# Patient Record
Sex: MALE | Race: WHITE | NOT HISPANIC OR LATINO | Employment: OTHER | URBAN - METROPOLITAN AREA
[De-identification: names, ages, dates, MRNs, and addresses within clinical notes are randomized per-mention and may not be internally consistent; named-entity substitution may affect disease eponyms.]

---

## 2022-05-12 ENCOUNTER — TELEPHONE (OUTPATIENT)
Dept: NEUROSURGERY | Facility: OTHER | Age: 67
End: 2022-05-12

## 2022-05-12 NOTE — TELEPHONE ENCOUNTER
Pedro from [facility]- looking for faxed referral. Message sent to referral team in house to review incoming faxes.  If need be another referral can be faxed.     [phone]

## 2022-06-14 ENCOUNTER — OFFICE VISIT (OUTPATIENT)
Dept: NEUROSURGERY | Facility: CLINIC | Age: 67
End: 2022-06-14
Payer: MEDICARE

## 2022-06-14 VITALS
DIASTOLIC BLOOD PRESSURE: 72 MMHG | WEIGHT: 186.2 LBS | SYSTOLIC BLOOD PRESSURE: 128 MMHG | HEART RATE: 56 BPM | BODY MASS INDEX: 29.22 KG/M2 | HEIGHT: 67 IN | OXYGEN SATURATION: 99 %

## 2022-06-14 DIAGNOSIS — M54.12 CERVICAL RADICULOPATHY: Primary | ICD-10-CM

## 2022-06-14 PROCEDURE — 99203 OFFICE O/P NEW LOW 30 MIN: CPT | Performed by: NEUROLOGICAL SURGERY

## 2022-06-14 RX ORDER — ACETAMINOPHEN 160 MG/5ML
160 SUSPENSION, ORAL (FINAL DOSE FORM) ORAL DAILY
COMMUNITY

## 2022-06-14 RX ORDER — LEVOTHYROXINE SODIUM 100 UG/1
100 TABLET ORAL DAILY
COMMUNITY

## 2022-06-14 RX ORDER — ASPIRIN 81 MG/1
81 TABLET ORAL DAILY
Status: ON HOLD | COMMUNITY
End: 2022-09-22

## 2022-06-14 RX ORDER — MULTIPLE VITAMINS W/ MINERALS TAB 9MG-400MCG
1 TAB ORAL DAILY
COMMUNITY

## 2022-06-14 ASSESSMENT — PAIN SCALES - GENERAL: PAINLEVEL: NO PAIN (0)

## 2022-06-14 NOTE — PROGRESS NOTES
Mr. Guevara is a 67-year-old man seen today at the request of Dr. Aleksandra Adorno for evaluation of bilateral hand numbness and paresthesias present for several years but increased in the last several months with right side predominant.  He has undergone a recent cervical MRI scan which shows significant cervical spondylitic change in his mid cervical spine and there is expressed concern whether this arises as cervical radiculopathy.  It is noted that he has no complaints of neck pain and does not associate cervical range of motion which is essentially normal for age and condition with the symptoms into his hands.  He has been treated with a cock up wrist brace on the right which he believes has been helpful with sleep.  He is awakened at night otherwise with the symptoms.  He denies any symptoms consistent with radiculopathy or symptoms proximal to the wrist bilaterally.  He is noted to be right-hand dominant.  His most symptomatic posterior is while driving with his hands elevated on the steering well.    He is a retired service industry .    On examination, he is awake alert and oriented x3 with memory intact for recent and remote events and speech clear in character and content.  There are no focal cranial nerve abnormalities identified.  Cervical range of motion appears normal for age and condition he is able to touch his chin to his chest and shows at least 20 degrees of cervical extension without symptoms.  He is capable of greater than 45 degrees of rotation in either direction.  Spurling sign is negative.  Lhermitte's phenomenon is absent.  He denies significant cervical pain with range of motion.  Motor examination of his bilateral upper extremity shows 5/5 strength throughout including handgrip, biceps, brachioradialis, triceps, deltoids, etc.  D-Phen reflex testing is 1+ and equal to biceps triceps and brachioradialis bilaterally.  There is no evidence of long track findings and gait is  normal.    Phalen's and Tinel's sign are negative at the wrist bilaterally.    Review of his cervical MRI scan obtained on April 6, 2022 shows significant multilevel cervical spondylitic change most pronounced at the C5-6 level with anterior and posterior osteophyte formation and midline broad-based disc bulging.  Significant Modic changes are noted at C5-6 and C6-7.  There is slight anterolisthesis of C4 on C5.  There is reversal of expected cervical lordosis centered at the C5-6 level.  Cervical spinal canal however is widely patent and there is no evidence of significant impingement of the cervical spinal cord to my review.  There is no intrinsic signal change in the spinal cord at any level.  There are multiple levels of bilateral neural foraminal stenosis noted through the mid cervical spine.    Bilateral hand numbness involving the index middle finger and thumb as well as partial involvement of the fourth finger strongly suggesting bilateral median neuropathy at the wrist consistent with carpal tunnel syndrome.  I find no evidence on examination of cervical radiculopathy.  I have requested an EMG of the upper extremities to determine the source of these paresthesias.  I have asked Mr. Guevara to return following that study for reevaluation and discussion of management alternatives.  Patient was accompanied by his wife was present throughout the course the interview and examination.  Both patient and his wife appear to have good understanding of our discussion, asked appropriate questions which I answered and willing to proceed as recommended.    Approximately 40 minutes in total was spent with the patient majority reviewing differential diagnosis, management alternatives, risks and benefits.

## 2022-06-14 NOTE — PATIENT INSTRUCTIONS
Patient Next Steps:      Order placed for Bilateral hands EMG. You can call the phone number highlighted in the order to schedule your appointment. We will call you with the results once EMG is completed.    Dr Ca would like to see you back in the clinic for follow up in once EMG is completed.    Please call us if you have any further questions or concerns.    [facility]   Phone: [phone]  Fax: [phone]

## 2022-08-10 ENCOUNTER — OFFICE VISIT (OUTPATIENT)
Dept: NEUROLOGY | Facility: CLINIC | Age: 67
End: 2022-08-10
Attending: NEUROLOGICAL SURGERY
Payer: MEDICARE

## 2022-08-10 DIAGNOSIS — G56.23 CUBITAL TUNNEL SYNDROME, BILATERAL: ICD-10-CM

## 2022-08-10 DIAGNOSIS — G56.03 BILATERAL CARPAL TUNNEL SYNDROME: ICD-10-CM

## 2022-08-10 DIAGNOSIS — M54.12 CERVICAL RADICULOPATHY: ICD-10-CM

## 2022-08-10 PROCEDURE — 95886 MUSC TEST DONE W/N TEST COMP: CPT | Mod: RT | Performed by: PSYCHIATRY & NEUROLOGY

## 2022-08-10 PROCEDURE — 95912 NRV CNDJ TEST 11-12 STUDIES: CPT | Performed by: PSYCHIATRY & NEUROLOGY

## 2022-08-10 NOTE — LETTER
8/10/2022         RE: Salvatore Guevara  [address]  [place] [facility] [address]        Dear Colleague,    Thank you for referring your patient, Salvatore Guevara, to the [facility] [place]. Please see a copy of my visit note below.    See procedure note for EMG report      Again, thank you for allowing me to participate in the care of your patient.        Sincerely,        Eliud Earl MD

## 2022-08-10 NOTE — PROCEDURES
ELECTROMYOGRAPHY (EMG) REPORT       [facility]UR[facility] [place]  [address] [place] [address]  Tel: [phone]  Fax: [phone]  [email or web address]     Salvatore Guevara,  1955, MRN [id number]  PCP: Aleksandra Adorno  Date: 8/10/2022     Principal Diagnosis:    Cervical radiculopathy  Bilateral carpal tunnel syndrome  Cubital tunnel syndrome, bilateral     Height: 5 feet 7 inch  Reason for referral: Evaluate bilateral uppers. c/o tingling in both hands/fingers > 5 years. Right > Left.       Motor NCS      Nerve / Sites Lat Amp Dist Patel    ms mV cm m/s   R Median - APB      Wrist 7.97 8.1 7       Elbow 13.49 7.2 26 47   L Median - APB      Wrist 5.42 11.5 7       Elbow 10.16 11.0 24 51   R Ulnar - ADM      Wrist 2.92 11.8 7       B.Elbow 6.35 11.7 20.5 60      A.Elbow 8.85 11.4 12 48   L Ulnar - ADM      Wrist 2.71 13.5 7       B.Elbow 5.73 12.6 19.5 65      A.Elbow 8.39 12.0 12 45       F  Wave      Nerve Fmin    ms   R Ulnar - ADM 29.84   L Ulnar - ADM 28.39       Sensory NCS      Nerve / Sites Onset Lat Pk Lat Amp.2-3 Dist Patel Lat Diff    ms ms  V cm m/s ms   R Median - II (Antidr)      Wrist 4.74 5.73 7.5 13 27    L Median - II (Antidr)      Wrist 3.85 4.74 6.3 13 34    R Ulnar - V (Antidr)      Wrist 2.34 2.86 7.2 11 47    L Ulnar - V (Antidr)      Wrist 2.45 3.33 6.9 11 45    R Median, Ulnar - Transcarpal comparison      Median Palm 3.65 4.53 8.1 8 22       Ulnar Palm 1.35 2.03 14.7 8 59          2.50   L Median, Ulnar - Transcarpal comparison      Median Palm 3.33 3.85 11.8 8 24       Ulnar Palm 1.46 2.03 22.0 8 55          1.82       EMG Summary Table     Spontaneous MUAP Rcmt Note   Muscle Fib PSW Fasc IA # Amp Dur PPP Rate Type   R. Brachioradialis None None None N N N N N N N   R. Pronator teres None None None N N N N N N N   R. Biceps brachii None None None N N N N N N N   R. Deltoid None None None N N N N N N N   R. Triceps brachii None None None N N N N N N  N   R. Extensor digitorum communis None None None N N N N N N N   R. Flexor carpi ulnaris None None None N N N N N N N   R. Abductor digiti minimi (manus) None None None N N N N N N N   R. First dorsal interosseous None None None N N N N N N N   R. Abductor pollicis brevis None None None N Sl Mod Red Incr Incr N N N   L. Brachioradialis None None None N N N N N N N   L. Pronator teres None None None N N N N N N N   L. Biceps brachii None None None N N N N N N N   L. Deltoid None None None N N N N N N N   L. Triceps brachii None None None N N N N N N N   L. Flexor carpi ulnaris None None None N N N N N N N   L. First dorsal interosseous None None None N N N N N N N   L. Abductor pollicis brevis None None None N N N N N N N        Summary: Nerve conduction and EMG study of bilateral upper extremities shows:  1. Delayed bilateral median distal motor latencies with normal amplitudes and conduction velocities.  2. Normal bilateral ulnar distal motor latencies with normal amplitude and slow conduction across the elbow  3. Normal bilateral ulnar F latencies  4. Delayed bilateral median peak sensory latencies with slow sensory nerve conduction velocities  5. Normal bilateral ulnar SNAP  6. Needle exam showed changes as above    Impression:   This is a abnormal nerve conduction and EMG study of bilateral lower extremities that suggests:  1. Bilateral median neuropathy at or distal to the wrist consistent with moderate to severe bilateral carpal tunnel syndrome  2. Bilateral ulnar entrapment at the elbow consistent with bilateral cubital tunnel syndrome.      Eliud Earl MD  [facility]  (Formerly, [facility]      This note was dictated using voice recognition software.  Any grammatical or context distortions are unintentional and inherent to the software.

## 2022-08-30 ENCOUNTER — OFFICE VISIT (OUTPATIENT)
Dept: NEUROSURGERY | Facility: CLINIC | Age: 67
End: 2022-08-30
Payer: MEDICARE

## 2022-08-30 VITALS — HEART RATE: 54 BPM | OXYGEN SATURATION: 97 % | SYSTOLIC BLOOD PRESSURE: 126 MMHG | DIASTOLIC BLOOD PRESSURE: 80 MMHG

## 2022-08-30 DIAGNOSIS — G56.03 BILATERAL CARPAL TUNNEL SYNDROME: Primary | ICD-10-CM

## 2022-08-30 PROCEDURE — 99213 OFFICE O/P EST LOW 20 MIN: CPT | Performed by: NEUROLOGICAL SURGERY

## 2022-08-30 ASSESSMENT — PAIN SCALES - GENERAL: PAINLEVEL: EXTREME PAIN (9)

## 2022-08-30 NOTE — NURSING NOTE
"Reviewed pre- and post-operative instructions as outlined in the After Visit Summary/Patient Instructions with patient.   Surgery folder was given to patient    Patient Education Topic: Procedure with Dr. Ca     Learner(s): Patient and Significant other/Spouse     Knowledge Level: Basic    Readiness to Learn: Ready    Method:  Verbal explanation and Written material     Outcome: Able to verbalize instructions    Barriers to Learning: No barrier    Covid Testing: patient educated they will need to have a test for the novel Coronavirus, COVID-19.The rapid antigen test needs to be completed within 1-2 days prior to surgery. Patient instructed to take a photo of the negative test and bring to surgery to show to the nurse. If positive, patient instructed to refer to the \"Before Your Procedure or Hospital Admission\" printout.. Order Placed.    Scheduling Number: [phone]        Patient had the opportunity for questions to be answered. Advised Patient and Significant other/Spouse  to call clinic with any questions/concerns. Gave patient antibacterial soap for pre-surgery skin preparation.    t  "

## 2022-08-30 NOTE — NURSING NOTE
"Salvatore Guevara is a 67 year old male who presents for:  Chief Complaint   Patient presents with     RECHECK     Review EMG results        Initial Vitals:  /80   Pulse 54   SpO2 97%  Estimated body mass index is 29.16 kg/m  as calculated from the following:    Height as of 6/14/22: 5' 7\" (1.702 m).    Weight as of 6/14/22: 186 lb 3.2 oz (84.5 kg).. There is no height or weight on file to calculate BSA. BP completed using cuff size: large  Extreme Pain (9)    Nursing Comments:     Juan Linn MA    "

## 2022-08-30 NOTE — PROGRESS NOTES
Mr. Guevara returns with a continued complaint of numbness into his right thumb, index and middle finger.  He has undergone an EMG which shows evidence of severe bilateral median neuropathy at the wrist.  EMG also showed evidence of significant bilateral ulnar neuropathy as well.  However, on examination there is no evidence of ulnar innervated musculature weakness or sensory loss.  There is no pain complaint in an ulnar distribution and no evidence of atrophy of intrinsic hand musculature.    I reviewed the differential diagnosis for this problem.  While Mr. Guevara clearly suffers from cervical spondylitic disease there is no apparent association between his hand symptoms and these findings on cervical MRI scan.  Specifically, he denies neck pain or any exacerbation of his upper extremity symptoms with cervical range of motion or other activities or postures.  However, he has a positive Tinel's sign at the right wrist, notes that the use of a wrist brace has improved his ability to sleep and reports that he is still awakened at night by paresthesias into his hands, right worse than the left.    Assessment: Symptomatic bilateral carpal tunnel syndrome with the right more affected than the left.  I reviewed management options with the patient and his wife at some length in detail.  I have recommended proceeding with right carpal tunnel release.  I outlined the details of the procedure to the patient and told him that the risks of the procedure include failure to improve his symptoms, increased pain weakness or numbness, injury to the nerve or its branches, infection, blood bleeding, wound dehiscence, etc.  At the conclusion of our discussion he appeared to have good understanding of the situation, asked appropriate questions which answered and wished to proceed with surgery as soon as practical.  Anticipate this will be performed using a local anesthetic with IV sedation on an outpatient  basis.    Approximately 25 minutes in total was spent with the patient today the majority reviewing differential diagnosis, management alternatives, risks and benefits.

## 2022-08-30 NOTE — LETTER
8/30/2022         RE: Salvatore Guevara  [address]  [facility] [address]        Dear Colleague,    Thank you for referring your patient, Salvatore Guevara, to the [facility]. Please see a copy of my visit note below.    Mr. Guevara returns with a continued complaint of numbness into his right thumb, index and middle finger.  He has undergone an EMG which shows evidence of severe bilateral median neuropathy at the wrist.  EMG also showed evidence of significant bilateral ulnar neuropathy as well.  However, on examination there is no evidence of ulnar innervated musculature weakness or sensory loss.  There is no pain complaint in an ulnar distribution and no evidence of atrophy of intrinsic hand musculature.    I reviewed the differential diagnosis for this problem.  While Mr. Guevara clearly suffers from cervical spondylitic disease there is no apparent association between his hand symptoms and these findings on cervical MRI scan.  Specifically, he denies neck pain or any exacerbation of his upper extremity symptoms with cervical range of motion or other activities or postures.  However, he has a positive Tinel's sign at the right wrist, notes that the use of a wrist brace has improved his ability to sleep and reports that he is still awakened at night by paresthesias into his hands, right worse than the left.    Assessment: Symptomatic bilateral carpal tunnel syndrome with the right more affected than the left.  I reviewed management options with the patient and his wife at some length in detail.  I have recommended proceeding with right carpal tunnel release.  I outlined the details of the procedure to the patient and told him that the risks of the procedure include failure to improve his symptoms, increased pain weakness or numbness, injury to the nerve or its branches, infection, blood bleeding, wound dehiscence, etc.  At the conclusion of our discussion he  appeared to have good understanding of the situation, asked appropriate questions which answered and wished to proceed with surgery as soon as practical.  Anticipate this will be performed using a local anesthetic with IV sedation on an outpatient basis.    Approximately 25 minutes in total was spent with the patient today the majority reviewing differential diagnosis, management alternatives, risks and benefits.      Again, thank you for allowing me to participate in the care of your patient.        Sincerely,        Chan Ca MD

## 2022-08-30 NOTE — PATIENT INSTRUCTIONS
"  Patient Instructions    -Surgery scheduled at [facility] for Right Carpal Tunnel release with Dr. Ca    Pre-Operative:    Surgical risks: blood clots in the leg or lung, problems urinating, nerve damage, drainage from the incision, infection,stiffness  Pre-operative physical with primary care physician within 30 days of surgical date.  Likely same day procedure with discharge home day of surgery, may stay for 23 hour observation hospitalization for monitoring.    Surgery takes about 1 hour. You will be completely asleep (general anesthesia)     Shower procedure  Please shower with antimicrobial soap the night before surgery and morning of surgery. Please refer to showering instruction sheet in folder.    Eating/Drinking  Stop all solid foods 8 hours before surgery.  Keep drinking clear liquids until 4 hours before surgery  Clear liquids include water, clear juice, black coffee, or clear tea without milk, Gatorade, clear soda.   Medications  Hold Aspirin, NSAIDs (Advil/Ibuprofen, Indocin, Naproxen,Nuprin,Relafen/Nabumetone, Diclofenac,Meloxicam, Aleve, Celebrex) x 7 days prior to surgical date  Hold methotrexate, Xeljanz for 1-2 weeks prior to surgery and continue to hold 2 weeks post surgery.   You can take Tylenol (Acetaminophen) for pain, 1000 mg  Do not exceed 3,000 mg per day   Any other medications prescribed, please discuss with your primary care provider at your pre-operative physical     As part of preparation for your upcoming procedure you are required to have a test for the novel Coronavirus, COVID-19  SDS Covid Testing: You will need to have a test for the novel Coronavirus, COVID-19.The rapid antigen test needs to be completed within 1-2 days prior to surgery. Please take a photo of the negative test and bring to surgery to show the nurse. If positive, please refer to the \"Before Your Procedure or Hospital Admission\" printout.  You may NOT receive the COVID-19 vaccine 72 hours " before or after surgery.    Post-Operative:  You may resume regular diet after surgery  Pain Management  You will have post-operative incisional pain which may require pain medications and muscle relaxants. You will receive medication upon discharge.  You may resume taking NSAIDs (ex. Ibuprofen, aleve, naproxen) immediately post-op   Do NOT drive while taking narcotic pain medication  Do NOT drink alcohol while using any pain medication  You can utilize ice as needed (no longer than 20 minutes at one time)  Ice and elevation to control swelling can also be effective in helping to control pain           Incision Care  No submerging incision in water such as pools, hot tubs, baths for at least 8 weeks or until incision is healed  It is okay to shower, just pat the incision dry   Remove dressing as instructed upon discharge  Watch for signs of infection  Redness, swelling, warmth, drainage, and fever of 101 degrees or higher  Notify clinic [phone].      Activity Restrictions  For the first 6 weeks, no lifting more than 5 pounds with operative arm. Overuse and repetitive tasks with your operative arm should be avoided. You will gradually increase how much, and for how long, you use your arm. Use pain as your guide.  Ok to walk as tolerated, take short frequent walks. You may gradually increase the distance as tolerated.   Avoid bed rest and prolonged sitting for longer than 30 minutes (change positions frequently while awake)  No contact sports until after follow up visit  No high impact activities such as; running/jogging, snowmobile or 4 jorge riding or any other recreational vehicles  Please call the clinic if you develop any of the following symptoms:  Swelling and/or warmth in one or both legs  Pain or tenderness in your leg, ankle, foot, or arm   Red or discolored skin         Post-Op Follow Up Appointments  2 week incision check with RN  6 week post op follow up visit with Physician Assistant or Nurse  Practitioner.   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  Please fax any FMLA/short term disability paperwork to [phone]  You may call our clinic when you'd like to return to work and we can provide a work letter  To allow staff adequate time to complete paperwork, please send as soon as possible      Carpal Tunnel Release Surgery  Surgery may be done if your carpal tunnel syndrome (CTS) symptoms become severe. Or you may have surgery if no other treatment eases your symptoms. There are 2 types of CTS surgical procedures. You will be told about the one you will have. You ll also be instructed on how to prepare for it.      The goals of surgery  Two types of surgery are used to treat CTS: open and endoscopic.  Open surgery. With open surgery, your surgeon makes 1 cut (incision) in your palm. Standard surgical tools are used.  Endoscopic surgery. For this surgery, 1 or 2 small incisions are made in your hand or wrist. A thin tube with a very small camera attached (endoscope) is inserted under the carpal ligament. Tiny tools are inserted there as well. The surgeon then operates while watching images on a video screen. Each procedure has the same goal: Your surgeon will relieve pressure on the median nerve. To do this, the transverse carpal ligament is cut (released).  After surgery  If you ve had carpal tunnel surgery, you will spend a few hours resting before you go home. The nerve sensation and blood circulation in your hand will be checked at this time. For the safest healing, do the following:  Keep your hand raised above heart level. This will help reduce swelling.  Limit hand and wrist use as instructed. You may need a wrist brace.  Take any pain  medicine as directed.  Do hand exercises as directed by your surgeon or therapist.  When to call the surgeon  Call your surgeon if you notice any of the following:  White or pale-blue hand or nails (you pinch your skin or nail and the color doesn t return)  Pain that is not relieved by prescribed medicine  Loss of sensation or excess swelling in hand or fingers  Pus-like liquid oozing out of your wound  Fever over 100.4 F (38 C)  Date Last Reviewed: 1/1/2018 2000-2019 [employer]. [address] [place], [place] [address]. All rights reserved. This information is not intended as a substitute for professional medical care. Always follow your healthcare professional's instructions.

## 2022-09-20 ENCOUNTER — TELEPHONE (OUTPATIENT)
Dept: NEUROSURGERY | Facility: CLINIC | Age: 67
End: 2022-09-20

## 2022-09-20 NOTE — TELEPHONE ENCOUNTER
Called pt back and discussed that pain medication is not pre-prescribed prior to surgery. It is recommended to choose a pharmacy of choice prior to surgery to have rx sent to that would be open at time of anticipated discharge.

## 2022-09-20 NOTE — TELEPHONE ENCOUNTER
Patient is requesting a return call, he would like to request pain medication be ordered now so that he can pick them up directly after his surgery on 9/22/22.

## 2022-09-21 ENCOUNTER — ANESTHESIA EVENT (OUTPATIENT)
Dept: SURGERY | Facility: CLINIC | Age: 67
End: 2022-09-21
Payer: MEDICARE

## 2022-09-21 NOTE — ANESTHESIA PREPROCEDURE EVALUATION
"Anesthesia Pre-Procedure Evaluation    Patient: Salvatore Guevara   MRN: [id number] : 1955        Procedure : Procedure(s):  Right carpal tunnel release          Past Medical History:   Diagnosis Date     Hypothyroidism       Past Surgical History:   Procedure Laterality Date     CARDIAC CATHERIZATION       COLONOSCOPY       ORTHOPEDIC SURGERY      RIGHT & LEFT TOTAL HIP ARTHROPLASTY     TONSILLECTOMY        No Known Allergies   Social History     Tobacco Use     Smoking status: Never Smoker     Smokeless tobacco: Never Used   Substance Use Topics     Alcohol use: Yes     Comment: 14 BEERS/ WEEK      Wt Readings from Last 1 Encounters:   22 84.5 kg (186 lb 3.2 oz)        Prior to Admission medications    Medication Sig Start Date End Date Taking? Authorizing Provider   amoxicillin (AMOXIL) 500 MG capsule Take 500 mg by mouth See Admin Instructions Take 4 capsules ONE hour prior to dental procedure   Yes Reported, Patient   clotrimazole (LOTRIMIN) 1 % external cream Apply topically 2 times daily   Yes Reported, Patient   levothyroxine (SYNTHROID/LEVOTHROID) 100 MCG tablet Take 100 mcg by mouth daily Take 1 tab by mouth daily   Yes Reported, Patient   MISC NATURAL PRODUCTS PO Melaleuca brand product \"Longevity packs\" AM and PM doses   Yes Reported, Patient   sildenafil (VIAGRA) 100 MG tablet Take 100 mg by mouth daily as needed   Yes Reported, Patient   aspirin 81 MG EC tablet Take 81 mg by mouth daily Take one tab daily    Reported, Patient   multivitamin w/minerals (THERA-VIT-M) tablet Take 1 tablet by mouth daily Take 1 tab daily    Reported, Patient   Saw [place] (Serenoa repens) (SAW PALMETTO EXTRACT) 160 MG CAPS Take 160 mg by mouth daily Take 2 caps by mouth every evening    Reported, Patient     Anesthesia Evaluation   Pt has had prior anesthetic. Type: General.        ROS/MED HX  ENT/Pulmonary:       Neurologic:       Cardiovascular:     (+) --CAD (minimal per cath) ---Previous cardiac " testing     METS/Exercise Tolerance:     Hematologic:       Musculoskeletal:   (+) arthritis,     GI/Hepatic:       Renal/Genitourinary:       Endo:     (+) thyroid problem, hypothyroidism,     Psychiatric/Substance Use:       Infectious Disease:       Malignancy:       Other:                  Trey Francis MD - 2016 12:00 AM CDT   Formatting of this note is different from the original.       [facility]     CARDIAC CATHETERIZATION REPORT       PATIENT DATA   Name:  Salvatore Guevara   Date:  2016   :  1955   Sex:  M   Record #  [id number]     CLINICAL HISTORY:    Salvatore is a 61-year-old male who presented to the emergency room with chest pain and severe shortness of breath.  EKG identified marked ST segment depression, as well as new atrial fibrillation with rapid ventricular response.  He is undergoing emergent coronary angiography for further evaluation.     PRIMARY PROVIDER:    Aleksandra Adorno M.D.     CARDIOLOGIST PERFORMING STUDY:   Trey Francis M.D.     TYPE OF PROCEDURE:     1) Selective coronary angiography.   2) Left heart catheterization.   3) Left ventriculography.   4) Selective right iliac arteriogram.   5) Perclose.     PROCEDURE:   A 6 Norwegian sheath was introduced into the right femoral artery and selective coronary angiography was performed with 6 Norwegian FL4 and 6 Norwegian FR4 diagnostic catheters.  Left heart catheterization and left ventriculography were completed with a 6 Norwegian pigtail catheter. A selective right iliac arteriogram was performed and Perclose was used to repair the right femoral arterial access site.       HEMODYNAMIC DATA:    Please see data sheet.  The aortic pressure was 110/70.  The left ventricular pressure was 110/18.     ANGIOGRAPHY:   LEFT VENTRICULOGRAPHY revealed normal systolic function with an ejection fraction of 70%.  There was no mitral regurgitation.   CORONARY ANGIOGRAPHY:   LEFT MAIN:    The left main was very long with  mild luminal irregularities.   LAD:    The left anterior descending artery was free of significant disease.   CIRCUMFLEX:    The left circumflex was small, nondominant and free of significant disease.   RCA:    The right coronary artery was the dominant vessel and had mild luminal irregularities.     TECHNICAL FACTORS:  Sedation was achieved with IV Versed and fentanyl.       COMPLICATIONS:   None.     CONCLUSION:     1) Minimal coronary artery disease.     2) Normal left ventricular systolic function.     Nikunj Feng MD - 09/28/2016 12:00 AM CDT   Formatting of this note is different from the original.     [facility] [place], [place]   TRANSTHORACIC ECHOCARDIOGRAM REPORT   INDICATION: Acute myocarditis.       PROCEDURE:  Limited transthoracic, two-dimensional, and M-mode echocardiography was performed from the parasternal, apical, and subcostal windows.  Simultaneous pulsed-wave, continuous wave and color flow Doppler was performed.       IMAGE QUALITY:    Good.     ECG RHYTHM:    Sinus.     RESULTS:   This is a limited echocardiographic study.  Normal left ventricular chamber size noted.  Normal left ventricular wall motion with ejection fraction of 65%.       CONCLUSION:   1. Limited echocardiographic study.     2. Normal left ventricular wall motion.   3. Left ventricular ejection fraction visually estimated at 65%.     4. When compared to patient's previous study dated 6/27/2016, there is no change.       OUTSIDE LABS:    Anesthesia Plan    ASA Status:  2      Anesthesia Type: MAC.     - Reason for MAC: straight local not clinically adequate         Techniques and Equipment:       - Drips/Meds: Dexmed. bolus     Consents            Postoperative Care    Pain management: Multi-modal analgesia.   PONV prophylaxis: Ondansetron (or other 5HT-3), Background Propofol Infusion     Comments:    Other Comments: 2 drinks EtOH - may require higher doses  of sedation            Felice Irby MD

## 2022-09-22 ENCOUNTER — HOSPITAL ENCOUNTER (OUTPATIENT)
Facility: CLINIC | Age: 67
Discharge: HOME | End: 2022-09-22
Attending: NEUROLOGICAL SURGERY | Admitting: NEUROLOGICAL SURGERY
Payer: MEDICARE

## 2022-09-22 ENCOUNTER — ANESTHESIA (OUTPATIENT)
Dept: SURGERY | Facility: CLINIC | Age: 67
End: 2022-09-22
Payer: MEDICARE

## 2022-09-22 VITALS
TEMPERATURE: 97.7 F | WEIGHT: 189 LBS | RESPIRATION RATE: 19 BRPM | HEART RATE: 61 BPM | BODY MASS INDEX: 29.66 KG/M2 | SYSTOLIC BLOOD PRESSURE: 114 MMHG | DIASTOLIC BLOOD PRESSURE: 67 MMHG | HEIGHT: 67 IN | OXYGEN SATURATION: 98 %

## 2022-09-22 DIAGNOSIS — G56.01 RIGHT CARPAL TUNNEL SYNDROME: Primary | ICD-10-CM

## 2022-09-22 PROCEDURE — 258N000003 HC RX IP 258 OP 636

## 2022-09-22 PROCEDURE — 710N000009 HC RECOVERY PHASE 1, LEVEL 1, PER MIN: Performed by: NEUROLOGICAL SURGERY

## 2022-09-22 PROCEDURE — 64721 CARPAL TUNNEL SURGERY: CPT | Mod: RT | Performed by: NEUROLOGICAL SURGERY

## 2022-09-22 PROCEDURE — 258N000003 HC RX IP 258 OP 636: Performed by: ANESTHESIOLOGY

## 2022-09-22 PROCEDURE — 360N000075 HC SURGERY LEVEL 2, PER MIN: Performed by: NEUROLOGICAL SURGERY

## 2022-09-22 PROCEDURE — 250N000009 HC RX 250

## 2022-09-22 PROCEDURE — 370N000017 HC ANESTHESIA TECHNICAL FEE, PER MIN: Performed by: NEUROLOGICAL SURGERY

## 2022-09-22 PROCEDURE — 999N000141 HC STATISTIC PRE-PROCEDURE NURSING ASSESSMENT: Performed by: NEUROLOGICAL SURGERY

## 2022-09-22 PROCEDURE — 250N000011 HC RX IP 250 OP 636

## 2022-09-22 PROCEDURE — 250N000009 HC RX 250: Performed by: NEUROLOGICAL SURGERY

## 2022-09-22 PROCEDURE — 250N000011 HC RX IP 250 OP 636: Performed by: PHYSICIAN ASSISTANT

## 2022-09-22 PROCEDURE — 272N000001 HC OR GENERAL SUPPLY STERILE: Performed by: NEUROLOGICAL SURGERY

## 2022-09-22 PROCEDURE — 710N000012 HC RECOVERY PHASE 2, PER MINUTE: Performed by: NEUROLOGICAL SURGERY

## 2022-09-22 RX ORDER — EPHEDRINE SULFATE 50 MG/ML
INJECTION, SOLUTION INTRAMUSCULAR; INTRAVENOUS; SUBCUTANEOUS PRN
Status: DISCONTINUED | OUTPATIENT
Start: 2022-09-22 | End: 2022-09-22

## 2022-09-22 RX ORDER — ONDANSETRON 4 MG/1
4 TABLET, ORALLY DISINTEGRATING ORAL EVERY 4 HOURS PRN
Status: DISCONTINUED | OUTPATIENT
Start: 2022-09-22 | End: 2022-09-22 | Stop reason: HOSPADM

## 2022-09-22 RX ORDER — ONDANSETRON 2 MG/ML
4 INJECTION INTRAMUSCULAR; INTRAVENOUS
Status: DISCONTINUED | OUTPATIENT
Start: 2022-09-22 | End: 2022-09-22 | Stop reason: HOSPADM

## 2022-09-22 RX ORDER — CLOTRIMAZOLE 1 %
CREAM (GRAM) TOPICAL 2 TIMES DAILY
COMMUNITY

## 2022-09-22 RX ORDER — HYDROMORPHONE HCL IN WATER/PF 6 MG/30 ML
0.2 PATIENT CONTROLLED ANALGESIA SYRINGE INTRAVENOUS EVERY 5 MIN PRN
Status: DISCONTINUED | OUTPATIENT
Start: 2022-09-22 | End: 2022-09-22 | Stop reason: HOSPADM

## 2022-09-22 RX ORDER — AMOXICILLIN 500 MG/1
500 CAPSULE ORAL SEE ADMIN INSTRUCTIONS
COMMUNITY

## 2022-09-22 RX ORDER — ONDANSETRON 2 MG/ML
INJECTION INTRAMUSCULAR; INTRAVENOUS PRN
Status: DISCONTINUED | OUTPATIENT
Start: 2022-09-22 | End: 2022-09-22

## 2022-09-22 RX ORDER — FENTANYL CITRATE 0.05 MG/ML
50 INJECTION, SOLUTION INTRAMUSCULAR; INTRAVENOUS EVERY 5 MIN PRN
Status: DISCONTINUED | OUTPATIENT
Start: 2022-09-22 | End: 2022-09-22 | Stop reason: HOSPADM

## 2022-09-22 RX ORDER — MEPERIDINE HYDROCHLORIDE 25 MG/ML
12.5 INJECTION INTRAMUSCULAR; INTRAVENOUS; SUBCUTANEOUS
Status: DISCONTINUED | OUTPATIENT
Start: 2022-09-22 | End: 2022-09-22 | Stop reason: HOSPADM

## 2022-09-22 RX ORDER — LIDOCAINE HYDROCHLORIDE 20 MG/ML
INJECTION, SOLUTION INFILTRATION; PERINEURAL PRN
Status: DISCONTINUED | OUTPATIENT
Start: 2022-09-22 | End: 2022-09-22

## 2022-09-22 RX ORDER — ACETAMINOPHEN 325 MG/1
650 TABLET ORAL
Status: DISCONTINUED | OUTPATIENT
Start: 2022-09-22 | End: 2022-09-22 | Stop reason: HOSPADM

## 2022-09-22 RX ORDER — LIDOCAINE 40 MG/G
CREAM TOPICAL
Status: CANCELLED | OUTPATIENT
Start: 2022-09-22

## 2022-09-22 RX ORDER — ONDANSETRON 2 MG/ML
4 INJECTION INTRAMUSCULAR; INTRAVENOUS EVERY 30 MIN PRN
Status: DISCONTINUED | OUTPATIENT
Start: 2022-09-22 | End: 2022-09-22 | Stop reason: HOSPADM

## 2022-09-22 RX ORDER — METHOCARBAMOL 750 MG/1
750 TABLET, FILM COATED ORAL
Status: DISCONTINUED | OUTPATIENT
Start: 2022-09-22 | End: 2022-09-22 | Stop reason: HOSPADM

## 2022-09-22 RX ORDER — LIDOCAINE HYDROCHLORIDE 10 MG/ML
INJECTION, SOLUTION EPIDURAL; INFILTRATION; INTRACAUDAL; PERINEURAL PRN
Status: DISCONTINUED | OUTPATIENT
Start: 2022-09-22 | End: 2022-09-22 | Stop reason: HOSPADM

## 2022-09-22 RX ORDER — SILDENAFIL 100 MG/1
100 TABLET, FILM COATED ORAL DAILY PRN
COMMUNITY

## 2022-09-22 RX ORDER — AMOXICILLIN 250 MG
1-2 CAPSULE ORAL 2 TIMES DAILY PRN
Qty: 60 TABLET | Refills: 0 | Status: SHIPPED | OUTPATIENT
Start: 2022-09-22 | End: 2023-06-06

## 2022-09-22 RX ORDER — SODIUM CHLORIDE, SODIUM LACTATE, POTASSIUM CHLORIDE, CALCIUM CHLORIDE 600; 310; 30; 20 MG/100ML; MG/100ML; MG/100ML; MG/100ML
INJECTION, SOLUTION INTRAVENOUS CONTINUOUS
Status: CANCELLED | OUTPATIENT
Start: 2022-09-22

## 2022-09-22 RX ORDER — CEFAZOLIN SODIUM/WATER 2 G/20 ML
2 SYRINGE (ML) INTRAVENOUS
Status: COMPLETED | OUTPATIENT
Start: 2022-09-22 | End: 2022-09-22

## 2022-09-22 RX ORDER — CEFAZOLIN SODIUM/WATER 2 G/20 ML
2 SYRINGE (ML) INTRAVENOUS SEE ADMIN INSTRUCTIONS
Status: DISCONTINUED | OUTPATIENT
Start: 2022-09-22 | End: 2022-09-22 | Stop reason: HOSPADM

## 2022-09-22 RX ORDER — OXYCODONE HYDROCHLORIDE 5 MG/1
5-10 TABLET ORAL EVERY 4 HOURS PRN
Qty: 20 TABLET | Refills: 0 | Status: ON HOLD | OUTPATIENT
Start: 2022-09-22 | End: 2023-02-08

## 2022-09-22 RX ORDER — LIDOCAINE 40 MG/G
CREAM TOPICAL
Status: DISCONTINUED | OUTPATIENT
Start: 2022-09-22 | End: 2022-09-22 | Stop reason: HOSPADM

## 2022-09-22 RX ORDER — FENTANYL CITRATE 50 UG/ML
INJECTION, SOLUTION INTRAMUSCULAR; INTRAVENOUS PRN
Status: DISCONTINUED | OUTPATIENT
Start: 2022-09-22 | End: 2022-09-22

## 2022-09-22 RX ORDER — ONDANSETRON 4 MG/1
4 TABLET, ORALLY DISINTEGRATING ORAL EVERY 30 MIN PRN
Status: DISCONTINUED | OUTPATIENT
Start: 2022-09-22 | End: 2022-09-22 | Stop reason: HOSPADM

## 2022-09-22 RX ORDER — OXYCODONE AND ACETAMINOPHEN 5; 325 MG/1; MG/1
1 TABLET ORAL EVERY 4 HOURS PRN
Status: DISCONTINUED | OUTPATIENT
Start: 2022-09-22 | End: 2022-09-22 | Stop reason: HOSPADM

## 2022-09-22 RX ORDER — FENTANYL CITRATE 0.05 MG/ML
25 INJECTION, SOLUTION INTRAMUSCULAR; INTRAVENOUS
Status: DISCONTINUED | OUTPATIENT
Start: 2022-09-22 | End: 2022-09-22 | Stop reason: HOSPADM

## 2022-09-22 RX ORDER — SODIUM CHLORIDE, SODIUM LACTATE, POTASSIUM CHLORIDE, CALCIUM CHLORIDE 600; 310; 30; 20 MG/100ML; MG/100ML; MG/100ML; MG/100ML
INJECTION, SOLUTION INTRAVENOUS CONTINUOUS
Status: DISCONTINUED | OUTPATIENT
Start: 2022-09-22 | End: 2022-09-22 | Stop reason: HOSPADM

## 2022-09-22 RX ORDER — PROPOFOL 10 MG/ML
INJECTION, EMULSION INTRAVENOUS PRN
Status: DISCONTINUED | OUTPATIENT
Start: 2022-09-22 | End: 2022-09-22

## 2022-09-22 RX ORDER — ASPIRIN 81 MG/1
81 TABLET ORAL DAILY
COMMUNITY
Start: 2022-09-22

## 2022-09-22 RX ORDER — FENTANYL CITRATE 50 UG/ML
50 INJECTION, SOLUTION INTRAMUSCULAR; INTRAVENOUS
Status: DISCONTINUED | OUTPATIENT
Start: 2022-09-22 | End: 2022-09-22 | Stop reason: HOSPADM

## 2022-09-22 RX ORDER — PROPOFOL 10 MG/ML
INJECTION, EMULSION INTRAVENOUS CONTINUOUS PRN
Status: DISCONTINUED | OUTPATIENT
Start: 2022-09-22 | End: 2022-09-22

## 2022-09-22 RX ADMIN — Medication 5 MG: at 16:19

## 2022-09-22 RX ADMIN — PHENYLEPHRINE HYDROCHLORIDE 100 MCG: 10 INJECTION INTRAVENOUS at 16:25

## 2022-09-22 RX ADMIN — Medication 5 MG: at 16:37

## 2022-09-22 RX ADMIN — ONDANSETRON 4 MG: 2 INJECTION INTRAMUSCULAR; INTRAVENOUS at 15:45

## 2022-09-22 RX ADMIN — MIDAZOLAM 2 MG: 1 INJECTION INTRAMUSCULAR; INTRAVENOUS at 15:42

## 2022-09-22 RX ADMIN — PHENYLEPHRINE HYDROCHLORIDE 100 MCG: 10 INJECTION INTRAVENOUS at 16:04

## 2022-09-22 RX ADMIN — PHENYLEPHRINE HYDROCHLORIDE 100 MCG: 10 INJECTION INTRAVENOUS at 16:31

## 2022-09-22 RX ADMIN — PHENYLEPHRINE HYDROCHLORIDE 100 MCG: 10 INJECTION INTRAVENOUS at 16:10

## 2022-09-22 RX ADMIN — FENTANYL CITRATE 50 MCG: 50 INJECTION, SOLUTION INTRAMUSCULAR; INTRAVENOUS at 15:45

## 2022-09-22 RX ADMIN — Medication 2 G: at 15:42

## 2022-09-22 RX ADMIN — PROPOFOL 150 MCG/KG/MIN: 10 INJECTION, EMULSION INTRAVENOUS at 15:45

## 2022-09-22 RX ADMIN — LIDOCAINE HYDROCHLORIDE 40 MG: 20 INJECTION, SOLUTION INFILTRATION; PERINEURAL at 15:45

## 2022-09-22 RX ADMIN — PROPOFOL 30 MG: 10 INJECTION, EMULSION INTRAVENOUS at 15:47

## 2022-09-22 RX ADMIN — SODIUM CHLORIDE, POTASSIUM CHLORIDE, SODIUM LACTATE AND CALCIUM CHLORIDE: 600; 310; 30; 20 INJECTION, SOLUTION INTRAVENOUS at 15:42

## 2022-09-22 RX ADMIN — PHENYLEPHRINE HYDROCHLORIDE 100 MCG: 10 INJECTION INTRAVENOUS at 16:19

## 2022-09-22 RX ADMIN — Medication 5 MG: at 16:13

## 2022-09-22 ASSESSMENT — ACTIVITIES OF DAILY LIVING (ADL)
ADLS_ACUITY_SCORE: 35
ADLS_ACUITY_SCORE: 35

## 2022-09-22 NOTE — ANESTHESIA POSTPROCEDURE EVALUATION
Patient: Salvatore Guevara    Procedure: Procedure(s):  Right carpal tunnel release       Anesthesia Type:  MAC    Note:  Disposition: Outpatient   Postop Pain Control: Uneventful            Sign Out: Well controlled pain   PONV: No   Neuro/Psych: Uneventful            Sign Out: Acceptable/Baseline neuro status   Airway/Respiratory: Uneventful            Sign Out: Acceptable/Baseline resp. status   CV/Hemodynamics: Uneventful            Sign Out: Acceptable CV status   Other NRE: NONE   DID A NON-ROUTINE EVENT OCCUR? No           Last vitals:  Vitals Value Taken Time   /67 09/22/22 1715   Temp 36.5  C (97.7  F) 09/22/22 1645   Pulse 70 09/22/22 1717   Resp 17 09/22/22 1717   SpO2 98 % 09/22/22 1717   Vitals shown include unvalidated device data.    Electronically Signed By: Dimas Sutherland MD  September 22, 2022  5:56 PM

## 2022-09-22 NOTE — DISCHARGE INSTRUCTIONS
Same Day Surgery Discharge Instructions for  Sedation and General Anesthesia     It's not unusual to feel dizzy, light-headed or faint for up to 24 hours after surgery or while taking pain medication.  If you have these symptoms: sit for a few minutes before standing and have someone assist you when you get up to walk or use the bathroom.    You should rest and relax for the next 24 hours. We recommend you make arrangements to have an adult stay with you for at least 24 hours after your discharge.  Avoid hazardous and strenuous activity.    DO NOT DRIVE any vehicle or operate mechanical equipment for 24 hours following the end of your surgery.  Even though you may feel normal, your reactions may be affected by the medication you have received.    Do not drink alcoholic beverages for 24 hours following surgery.     Slowly progress to your regular diet as you feel able. It's not unusual to feel nauseated and/or vomit after receiving anesthesia.  If you develop these symptoms, drink clear liquids (apple juice, ginger ale, broth, 7-up, etc. ) until you feel better.  If your nausea and vomiting persists for 24 hours, please notify your surgeon.      All narcotic pain medications, along with inactivity and anesthesia, can cause constipation. Drinking plenty of liquids and increasing fiber intake will help.    For any questions of a medical nature, call your surgeon.    Do not make important decisions for 24 hours.    If you had general anesthesia, you may have a sore throat for a couple of days related to the breathing tube used during surgery.  You may use Cepacol lozenges to help with this discomfort.  If it worsens or if you develop a fever, contact your surgeon.     If you feel your pain is not well managed with the pain medications prescribed by your surgeon, please contact your surgeon's office to let them know so they can address your concerns.

## 2022-09-22 NOTE — OP NOTE
Name of procedure: Right carpal tunnel release    Preoperative diagnosis: Right carpal tunnel syndrome    Postoperative diagnosis: Same    Surgeon: Chan Ca MD    First Assistant: None    EBL: 3 cc    Material for the laboratory for examination: None    Description of the procedure: Patient was brought to the operating room where he was placed in the supine position with his arm outstretched on an armboard.  IV sedation was initiated and his right hand, palmar region and distal wrist were then sterilely prepped and draped in the usual fashion.  A small curvilinear incision was made beginning near the palmar crease and extending with an ulnar extension across the palmar crease.  This was carried down through the subcutaneous tissue and copious subcutaneous fat.  Self-retaining retractors were placed and the carpal ligament was identified and sharply transected using a #15 blade.  There was immediate visualization of the median nerve.  This opening in the carpal ligament was extended first proximally and then distally using a small tenotomy scissors without difficulty.  Is a very small portion of the carpal ligament was resected over the point of maximum constriction.  At the conclusion of this portion of the procedure the median nerve appeared to be well decompressed from the distal wrist into the distal palm using in the Scherer Corona dissector to palpate.  Wound was then copiously irrigated with Ancef containing irrigation and meticulous hemostasis was assured with bipolar cautery.  Wound was closed in single layer using vertical mattress sutures with 4-0 nylon.  Sterile dressing was applied and patient was subsequently transported to the recovery room in stable condition.

## 2022-09-22 NOTE — ANESTHESIA CARE TRANSFER NOTE
Patient: Salvatore Guevara    Procedure: Procedure(s):  Right carpal tunnel release       Diagnosis: Bilateral carpal tunnel syndrome [G56.03]  Diagnosis Additional Information: No value filed.    Anesthesia Type:   MAC     Note:    Oropharynx: oropharynx clear of all foreign objects and spontaneously breathing  Level of Consciousness: awake  Oxygen Supplementation: face mask  Level of Supplemental Oxygen (L/min / FiO2): 6  Independent Airway: airway patency satisfactory and stable  Dentition: dentition unchanged  Vital Signs Stable: post-procedure vital signs reviewed and stable  Report to RN Given: handoff report given  Patient transferred to: PACU  Comments: At end of procedure, spontaneous respirations, patient alert to voice, able to follow commands. Oxygen via facemask at 6 liters per minute to PACU. Oxygen tubing connected to wall O2 in PACU, SpO2, NiBP, and EKG monitors and alarms on and functioning, report on patient's clinical status given to PACU RN, RN questions answered.  Handoff Report: Identifed the Patient, Identified the Reponsible Provider, Reviewed the pertinent medical history, Discussed the surgical course, Reviewed Intra-OP anesthesia mangement and issues during anesthesia, Set expectations for post-procedure period and Allowed opportunity for questions and acknowledgement of understanding      Vitals:  Vitals Value Taken Time   /57 09/22/22 1645   Temp     Pulse 73 09/22/22 1647   Resp 13 09/22/22 1647   SpO2 100 % 09/22/22 1647   Vitals shown include unvalidated device data.    Electronically Signed By: JUNIOR Morris CRNA  September 22, 2022  4:48 PM

## 2022-09-23 ENCOUNTER — TELEPHONE (OUTPATIENT)
Dept: NEUROSURGERY | Facility: CLINIC | Age: 67
End: 2022-09-23

## 2022-09-23 NOTE — TELEPHONE ENCOUNTER
Post-Op Call    DOS: 9/22/22  Surgery: Right carpal tunnel release  Surgeon: Dr Ca    Called patient for post-operative follow-up.     Attempted to reach out to patient, no answer. VMbox full

## 2022-10-06 ENCOUNTER — OFFICE VISIT (OUTPATIENT)
Dept: NEUROSURGERY | Facility: CLINIC | Age: 67
End: 2022-10-06
Payer: MEDICARE

## 2022-10-06 VITALS
SYSTOLIC BLOOD PRESSURE: 124 MMHG | DIASTOLIC BLOOD PRESSURE: 78 MMHG | HEIGHT: 67 IN | HEART RATE: 60 BPM | WEIGHT: 189 LBS | BODY MASS INDEX: 29.66 KG/M2 | OXYGEN SATURATION: 98 %

## 2022-10-06 DIAGNOSIS — G56.02 CARPAL TUNNEL SYNDROME OF LEFT WRIST: ICD-10-CM

## 2022-10-06 DIAGNOSIS — Z98.890 S/P CARPAL TUNNEL RELEASE: Primary | ICD-10-CM

## 2022-10-06 PROCEDURE — 99207 PR NO CHARGE NURSE ONLY: CPT

## 2022-10-06 ASSESSMENT — PAIN SCALES - GENERAL: PAINLEVEL: MILD PAIN (2)

## 2022-10-06 NOTE — PATIENT INSTRUCTIONS
Instructions for Patient    Incision  Steri-strips were applied today, they will fall off in 7-10 days. Do not to pull them off.   Keep your incision clean and dry at all times.   It is okay to shower, just pat the incision dry   No submerging incision in water such as pools, hot tubs, or baths for at least 8 weeks and until the incision is healed  Do not apply lotions or ointments to incision    Activity  No lifting greater than 5 lbs  Walking is the best way to start exercise after surgery. Take short frequent walks. You may gradually increase the distance as tolerated. If you feel pain, decrease your activity, but DO NOT stop walking. Walking will help you gain strength, prevent muscle soreness and spasms, and prevent blood clots.   Continue to wear brace as directed by your surgeon  Avoid bed rest and prolonged sitting for longer than 30 minutes (change positions frequently while awake)  No contact sports or high impact activities such as; running/jogging, snowmobile or 4 jorge riding or any other recreational vehicles until after given clearance at one of your follow up visits    Medications   Refills of pain medication:   Please call the neurosurgery clinic to request 2-3 days before you run out  Weaning from narcotic pain medications  When it is time, start weaning by extending the time between doses.   For example, if you're taking 2 tabs every 4 hours, spread it out to 2 tabs over 4.5, 5, 6 hours. At that point you can certainly cut down to 1 tab, then wean to an as needed basis until completely done with them.Refills: call our clinic 2-3 business days before you are out of medication. A nurse will call you back to obtain a pain assessment.   Don't take more than 3000mg of Acetaminophen in 24 hours  Ok to begin taking Aspirin and NSAIDs (ex: ibuprofen/Advil)  Encouraged icing for at least 3-4 times throughout the day for 20-30 minutes at a time. Avoid heat to the incision area.   Taking stool softeners  regularly can reduce constipation commonly caused by narcotic pain medications.    Contact clinic or Emergency Room if you develop:   Infection (redness, swelling, warmth, drainage, fever over 101 F)  New injury  Bladder or bowel changes or loss of control    Signs of blood clot:  Swelling and/or warmth in one or both legs  Pain or tenderness in your leg, ankle, foot, or arm   Red or discolored skin     Go to the Emergency Room   If sudden onset of severe headache, weakness, confusion, change in level of consciousness, pain, or loss of movement.  Chest pain  Trouble breathing     Post-operative appointments  6 week and 3 months post-op  11/2/22 at 9:30    [facility]  [facility]  [address] [place] [id number]  Telephone:  [phone]   Fax:  [phone]

## 2022-10-06 NOTE — PROGRESS NOTES
"Salvatore Guevara is a 67 year old male who presents for:  Chief Complaint   Patient presents with     RECHECK     R HAND         Initial Vitals:  /78   Pulse 60   Ht 5' 7\" (1.702 m)   Wt 189 lb (85.7 kg)   SpO2 98%   BMI 29.60 kg/m   Estimated body mass index is 29.6 kg/m  as calculated from the following:    Height as of this encounter: 5' 7\" (1.702 m).    Weight as of this encounter: 189 lb (85.7 kg).. Body surface area is 2.01 meters squared. BP completed using cuff size: regular  Mild Pain (2)    Nursing Comments:     Ana Castrejon MA    "

## 2022-10-06 NOTE — PROGRESS NOTES
Post-op Nurse Visit:    Patient presents today s/p Right carpal tunnel release on 9/22/22 with Chan Ca MD     Pain/Neuro Assessment  0-1/10    Numbness/tingling: NA   Strength: Equal strength to bilateral upper extremities. Denies weakness.     Pain Relief Measures:  Oxycodone: NA  Tylenol: NA  Ice: NA    Incision   Incision inspected. Edges well-approximated. No redness, swelling, drainage, or warmth noted. Incision prepped with ChloraPrep and suture(s) removed with some difficulty. Steri-strips applied.     Activity  Following restrictions   Falls: none  Patient is walking frequently without difficulty.   Legs examined in clinic; no redness, swelling, or warmth noted. Patient denies any pain in bilateral calves.     GI/  Difficulty swallowing? No  Patient's appetite is normal  Bowel/bladder problems? No  Taking stool softeners? No     Refills/x-rays/return to work  Refills given at this appointment? No  Sent for x-rays after this appointment? No  Ordered future x-rays? No  Return to work discussed at this appointment? Yes, pt is retired    All of patient's questions addressed today. Patient was instructed to call with any additional questions/concerns.      Pt was looking to schedule surgery for his Left CPT. Message emailed to Dr. Ca to review if another clinic appt is needed or if they may work towards scheduling surgery.

## 2022-10-06 NOTE — PROGRESS NOTES
Dr. Ca states that he will do patients Left CTR surgery upon his return to the OR at the end of this month. No need for an OV prior.     Will route to Dr. Ca's .

## 2022-10-30 ENCOUNTER — HEALTH MAINTENANCE LETTER (OUTPATIENT)
Age: 67
End: 2022-10-30

## 2022-11-10 ENCOUNTER — OFFICE VISIT (OUTPATIENT)
Dept: NEUROSURGERY | Facility: CLINIC | Age: 67
End: 2022-11-10
Payer: MEDICARE

## 2022-11-10 VITALS
WEIGHT: 189 LBS | HEART RATE: 80 BPM | SYSTOLIC BLOOD PRESSURE: 110 MMHG | BODY MASS INDEX: 29.66 KG/M2 | OXYGEN SATURATION: 96 % | DIASTOLIC BLOOD PRESSURE: 75 MMHG | HEIGHT: 67 IN

## 2022-11-10 DIAGNOSIS — Z98.890 S/P CARPAL TUNNEL RELEASE: Primary | ICD-10-CM

## 2022-11-10 PROCEDURE — 99024 POSTOP FOLLOW-UP VISIT: CPT | Performed by: PHYSICIAN ASSISTANT

## 2022-11-10 NOTE — PROGRESS NOTES
Neurosurgery 6-week follow-up    Mr. Guevara is a 67-year-old male is 6 weeks status post right carpal tunnel release.  He states his symptoms are much improved compared to before surgery so has occasional tingling in his right fifth finger which is intermittent and not particularly bothersome.  He denies any issues with his incision.  He is scheduled for surgery on his left hand in February, and is planning on proceeding with this as planned.    Exam     Alert and oriented no acute distress  Right hand with appropriate strength  Incision intact and well-healed    Assessment    Status post right carpal tunnel release  Plan for upcoming left carpal tunnel release      Plan    Gradually increase activity as tolerated.   Hand therapy visit postoperatively for rehabilitation.  Left carpal tunnel release in February with Dr. Ca

## 2022-11-10 NOTE — NURSING NOTE
"Salvatore Guevara is a 67 year old male who presents for:  Chief Complaint   Patient presents with     RECHECK     6wk follow up Carpel tunnel         Initial Vitals:  /75   Pulse 80   Ht 5' 7\" (1.702 m)   Wt 189 lb (85.7 kg)   SpO2 96%   BMI 29.60 kg/m   Estimated body mass index is 29.6 kg/m  as calculated from the following:    Height as of this encounter: 5' 7\" (1.702 m).    Weight as of this encounter: 189 lb (85.7 kg).. Body surface area is 2.01 meters squared. BP completed using cuff size: regular  Data Unavailable        Romi Hawthorne  "

## 2022-11-10 NOTE — LETTER
11/10/2022         RE: Salvatore Guevara  [phone]  [facility] [address]        Dear Colleague,    Thank you for referring your patient, Salvatore Guevara, to the [facility]. Please see a copy of my visit note below.    Neurosurgery 6-week follow-up    Mr. Guevara is a 67-year-old male is 6 weeks status post right carpal tunnel release.  He states his symptoms are much improved compared to before surgery so has occasional tingling in his right fifth finger which is intermittent and not particularly bothersome.  He denies any issues with his incision.  He is scheduled for surgery on his left hand in February, and is planning on proceeding with this as planned.    Exam     Alert and oriented no acute distress  Right hand with appropriate strength  Incision intact and well-healed    Assessment    Status post right carpal tunnel release  Plan for upcoming left carpal tunnel release      Plan    Gradually increase activity as tolerated.   Hand therapy visit postoperatively for rehabilitation.  Left carpal tunnel release in February with Dr. Ca      Again, thank you for allowing me to participate in the care of your patient.        Sincerely,        John Dee PA-C

## 2023-02-06 ENCOUNTER — TRANSFERRED RECORDS (OUTPATIENT)
Dept: MULTI SPECIALTY CLINIC | Facility: CLINIC | Age: 68
End: 2023-02-06

## 2023-02-06 LAB — TSH SERPL-ACNC: 1.39 UIU/ML (ref 0.47–5)

## 2023-02-08 ENCOUNTER — HOSPITAL ENCOUNTER (OUTPATIENT)
Facility: CLINIC | Age: 68
Discharge: HOME | End: 2023-02-08
Attending: NEUROLOGICAL SURGERY | Admitting: NEUROLOGICAL SURGERY
Payer: MEDICARE

## 2023-02-08 ENCOUNTER — ANESTHESIA (OUTPATIENT)
Dept: SURGERY | Facility: CLINIC | Age: 68
End: 2023-02-08
Payer: MEDICARE

## 2023-02-08 ENCOUNTER — ANESTHESIA EVENT (OUTPATIENT)
Dept: SURGERY | Facility: CLINIC | Age: 68
End: 2023-02-08
Payer: MEDICARE

## 2023-02-08 VITALS
BODY MASS INDEX: 30.45 KG/M2 | WEIGHT: 194 LBS | TEMPERATURE: 98 F | HEART RATE: 62 BPM | OXYGEN SATURATION: 95 % | RESPIRATION RATE: 16 BRPM | SYSTOLIC BLOOD PRESSURE: 120 MMHG | DIASTOLIC BLOOD PRESSURE: 70 MMHG | HEIGHT: 67 IN

## 2023-02-08 DIAGNOSIS — G56.03 BILATERAL CARPAL TUNNEL SYNDROME: Primary | ICD-10-CM

## 2023-02-08 PROCEDURE — 710N000009 HC RECOVERY PHASE 1, LEVEL 1, PER MIN: Performed by: NEUROLOGICAL SURGERY

## 2023-02-08 PROCEDURE — 710N000012 HC RECOVERY PHASE 2, PER MINUTE: Performed by: NEUROLOGICAL SURGERY

## 2023-02-08 PROCEDURE — 250N000011 HC RX IP 250 OP 636: Performed by: NURSE ANESTHETIST, CERTIFIED REGISTERED

## 2023-02-08 PROCEDURE — 370N000017 HC ANESTHESIA TECHNICAL FEE, PER MIN: Performed by: NEUROLOGICAL SURGERY

## 2023-02-08 PROCEDURE — 360N000075 HC SURGERY LEVEL 2, PER MIN: Performed by: NEUROLOGICAL SURGERY

## 2023-02-08 PROCEDURE — 258N000003 HC RX IP 258 OP 636: Performed by: NURSE ANESTHETIST, CERTIFIED REGISTERED

## 2023-02-08 PROCEDURE — 999N000141 HC STATISTIC PRE-PROCEDURE NURSING ASSESSMENT: Performed by: NEUROLOGICAL SURGERY

## 2023-02-08 PROCEDURE — 250N000009 HC RX 250: Performed by: NEUROLOGICAL SURGERY

## 2023-02-08 PROCEDURE — 272N000001 HC OR GENERAL SUPPLY STERILE: Performed by: NEUROLOGICAL SURGERY

## 2023-02-08 PROCEDURE — 64721 CARPAL TUNNEL SURGERY: CPT | Mod: LT | Performed by: NEUROLOGICAL SURGERY

## 2023-02-08 PROCEDURE — 250N000011 HC RX IP 250 OP 636: Performed by: PHYSICIAN ASSISTANT

## 2023-02-08 RX ORDER — OXYCODONE HYDROCHLORIDE 5 MG/1
5-10 TABLET ORAL EVERY 4 HOURS PRN
Qty: 20 TABLET | Refills: 0 | Status: SHIPPED | OUTPATIENT
Start: 2023-02-08 | End: 2023-03-01

## 2023-02-08 RX ORDER — HYDROMORPHONE HCL IN WATER/PF 6 MG/30 ML
0.2 PATIENT CONTROLLED ANALGESIA SYRINGE INTRAVENOUS EVERY 5 MIN PRN
Status: DISCONTINUED | OUTPATIENT
Start: 2023-02-08 | End: 2023-02-08 | Stop reason: HOSPADM

## 2023-02-08 RX ORDER — OXYCODONE AND ACETAMINOPHEN 5; 325 MG/1; MG/1
1 TABLET ORAL EVERY 4 HOURS PRN
Status: CANCELLED | OUTPATIENT
Start: 2023-02-08

## 2023-02-08 RX ORDER — ONDANSETRON 2 MG/ML
INJECTION INTRAMUSCULAR; INTRAVENOUS PRN
Status: DISCONTINUED | OUTPATIENT
Start: 2023-02-08 | End: 2023-02-08

## 2023-02-08 RX ORDER — LIDOCAINE HYDROCHLORIDE 10 MG/ML
INJECTION, SOLUTION EPIDURAL; INFILTRATION; INTRACAUDAL; PERINEURAL PRN
Status: DISCONTINUED | OUTPATIENT
Start: 2023-02-08 | End: 2023-02-08 | Stop reason: HOSPADM

## 2023-02-08 RX ORDER — HYDRALAZINE HYDROCHLORIDE 20 MG/ML
2.5-5 INJECTION INTRAMUSCULAR; INTRAVENOUS EVERY 10 MIN PRN
Status: DISCONTINUED | OUTPATIENT
Start: 2023-02-08 | End: 2023-02-08 | Stop reason: HOSPADM

## 2023-02-08 RX ORDER — FENTANYL CITRATE 0.05 MG/ML
25 INJECTION, SOLUTION INTRAMUSCULAR; INTRAVENOUS EVERY 5 MIN PRN
Status: DISCONTINUED | OUTPATIENT
Start: 2023-02-08 | End: 2023-02-08 | Stop reason: HOSPADM

## 2023-02-08 RX ORDER — MAGNESIUM HYDROXIDE 1200 MG/15ML
LIQUID ORAL PRN
Status: DISCONTINUED | OUTPATIENT
Start: 2023-02-08 | End: 2023-02-08 | Stop reason: HOSPADM

## 2023-02-08 RX ORDER — ACETAMINOPHEN 325 MG/1
650 TABLET ORAL
Status: CANCELLED | OUTPATIENT
Start: 2023-02-08

## 2023-02-08 RX ORDER — AMOXICILLIN 250 MG
1-2 CAPSULE ORAL 2 TIMES DAILY PRN
Qty: 60 TABLET | Refills: 0 | Status: SHIPPED | OUTPATIENT
Start: 2023-02-08 | End: 2023-06-06

## 2023-02-08 RX ORDER — PROPOFOL 10 MG/ML
INJECTION, EMULSION INTRAVENOUS CONTINUOUS PRN
Status: DISCONTINUED | OUTPATIENT
Start: 2023-02-08 | End: 2023-02-08

## 2023-02-08 RX ORDER — SODIUM CHLORIDE, SODIUM LACTATE, POTASSIUM CHLORIDE, CALCIUM CHLORIDE 600; 310; 30; 20 MG/100ML; MG/100ML; MG/100ML; MG/100ML
INJECTION, SOLUTION INTRAVENOUS CONTINUOUS
Status: DISCONTINUED | OUTPATIENT
Start: 2023-02-08 | End: 2023-02-08 | Stop reason: HOSPADM

## 2023-02-08 RX ORDER — CEFAZOLIN SODIUM/WATER 2 G/20 ML
2 SYRINGE (ML) INTRAVENOUS SEE ADMIN INSTRUCTIONS
Status: DISCONTINUED | OUTPATIENT
Start: 2023-02-08 | End: 2023-02-08 | Stop reason: HOSPADM

## 2023-02-08 RX ORDER — OXYCODONE HYDROCHLORIDE 5 MG/1
5 TABLET ORAL EVERY 4 HOURS PRN
Status: DISCONTINUED | OUTPATIENT
Start: 2023-02-08 | End: 2023-02-08 | Stop reason: HOSPADM

## 2023-02-08 RX ORDER — OXYCODONE HYDROCHLORIDE 5 MG/1
10 TABLET ORAL EVERY 4 HOURS PRN
Status: DISCONTINUED | OUTPATIENT
Start: 2023-02-08 | End: 2023-02-08 | Stop reason: HOSPADM

## 2023-02-08 RX ORDER — FENTANYL CITRATE 0.05 MG/ML
50 INJECTION, SOLUTION INTRAMUSCULAR; INTRAVENOUS EVERY 5 MIN PRN
Status: DISCONTINUED | OUTPATIENT
Start: 2023-02-08 | End: 2023-02-08 | Stop reason: HOSPADM

## 2023-02-08 RX ORDER — ONDANSETRON 4 MG/1
4 TABLET, ORALLY DISINTEGRATING ORAL EVERY 30 MIN PRN
Status: DISCONTINUED | OUTPATIENT
Start: 2023-02-08 | End: 2023-02-08 | Stop reason: HOSPADM

## 2023-02-08 RX ORDER — LABETALOL HYDROCHLORIDE 5 MG/ML
10 INJECTION, SOLUTION INTRAVENOUS
Status: DISCONTINUED | OUTPATIENT
Start: 2023-02-08 | End: 2023-02-08 | Stop reason: HOSPADM

## 2023-02-08 RX ORDER — ACETAMINOPHEN 325 MG/1
975 TABLET ORAL ONCE
Status: DISCONTINUED | OUTPATIENT
Start: 2023-02-08 | End: 2023-02-08 | Stop reason: HOSPADM

## 2023-02-08 RX ORDER — SODIUM CHLORIDE, SODIUM LACTATE, POTASSIUM CHLORIDE, CALCIUM CHLORIDE 600; 310; 30; 20 MG/100ML; MG/100ML; MG/100ML; MG/100ML
INJECTION, SOLUTION INTRAVENOUS CONTINUOUS PRN
Status: DISCONTINUED | OUTPATIENT
Start: 2023-02-08 | End: 2023-02-08

## 2023-02-08 RX ORDER — METHOCARBAMOL 750 MG/1
750 TABLET, FILM COATED ORAL
Status: CANCELLED | OUTPATIENT
Start: 2023-02-08

## 2023-02-08 RX ORDER — CEFAZOLIN SODIUM/WATER 2 G/20 ML
2 SYRINGE (ML) INTRAVENOUS
Status: COMPLETED | OUTPATIENT
Start: 2023-02-08 | End: 2023-02-08

## 2023-02-08 RX ORDER — ONDANSETRON 2 MG/ML
4 INJECTION INTRAMUSCULAR; INTRAVENOUS EVERY 30 MIN PRN
Status: DISCONTINUED | OUTPATIENT
Start: 2023-02-08 | End: 2023-02-08 | Stop reason: HOSPADM

## 2023-02-08 RX ORDER — ONDANSETRON 4 MG/1
4 TABLET, ORALLY DISINTEGRATING ORAL EVERY 4 HOURS PRN
Status: CANCELLED | OUTPATIENT
Start: 2023-02-08

## 2023-02-08 RX ORDER — DIMENHYDRINATE 50 MG/ML
25 INJECTION, SOLUTION INTRAMUSCULAR; INTRAVENOUS
Status: DISCONTINUED | OUTPATIENT
Start: 2023-02-08 | End: 2023-02-08 | Stop reason: HOSPADM

## 2023-02-08 RX ORDER — HYDROMORPHONE HCL IN WATER/PF 6 MG/30 ML
0.4 PATIENT CONTROLLED ANALGESIA SYRINGE INTRAVENOUS EVERY 5 MIN PRN
Status: DISCONTINUED | OUTPATIENT
Start: 2023-02-08 | End: 2023-02-08 | Stop reason: HOSPADM

## 2023-02-08 RX ADMIN — PHENYLEPHRINE HYDROCHLORIDE 100 MCG: 10 INJECTION INTRAVENOUS at 08:08

## 2023-02-08 RX ADMIN — PHENYLEPHRINE HYDROCHLORIDE 200 MCG: 10 INJECTION INTRAVENOUS at 08:42

## 2023-02-08 RX ADMIN — PROPOFOL 150 MCG/KG/MIN: 10 INJECTION, EMULSION INTRAVENOUS at 07:56

## 2023-02-08 RX ADMIN — PHENYLEPHRINE HYDROCHLORIDE 200 MCG: 10 INJECTION INTRAVENOUS at 08:13

## 2023-02-08 RX ADMIN — PHENYLEPHRINE HYDROCHLORIDE 200 MCG: 10 INJECTION INTRAVENOUS at 08:28

## 2023-02-08 RX ADMIN — PHENYLEPHRINE HYDROCHLORIDE 200 MCG: 10 INJECTION INTRAVENOUS at 08:19

## 2023-02-08 RX ADMIN — PHENYLEPHRINE HYDROCHLORIDE 200 MCG: 10 INJECTION INTRAVENOUS at 08:34

## 2023-02-08 RX ADMIN — ONDANSETRON 4 MG: 2 INJECTION INTRAMUSCULAR; INTRAVENOUS at 08:09

## 2023-02-08 RX ADMIN — SODIUM CHLORIDE, POTASSIUM CHLORIDE, SODIUM LACTATE AND CALCIUM CHLORIDE: 600; 310; 30; 20 INJECTION, SOLUTION INTRAVENOUS at 07:52

## 2023-02-08 RX ADMIN — Medication 2 G: at 07:58

## 2023-02-08 ASSESSMENT — LIFESTYLE VARIABLES: TOBACCO_USE: 0

## 2023-02-08 ASSESSMENT — ACTIVITIES OF DAILY LIVING (ADL)
ADLS_ACUITY_SCORE: 33
ADLS_ACUITY_SCORE: 35
ADLS_ACUITY_SCORE: 35

## 2023-02-08 NOTE — ANESTHESIA CARE TRANSFER NOTE
Patient: Salvatore Guevara    Procedure: Procedure(s):  Left carpal tunnel release       Diagnosis: Carpal tunnel syndrome of left wrist [G56.02]  Diagnosis Additional Information: No value filed.    Anesthesia Type:   MAC     Note:    Oropharynx: oropharynx clear of all foreign objects and spontaneously breathing  Level of Consciousness: awake  Oxygen Supplementation: face mask  Level of Supplemental Oxygen (L/min / FiO2): 6  Independent Airway: airway patency satisfactory and stable  Dentition: dentition unchanged  Vital Signs Stable: post-procedure vital signs reviewed and stable  Report to RN Given: handoff report given  Patient transferred to: PACU  Comments: At end of procedure, spontaneous respirations, patient alert to voice, able to follow commands. Oxygen via facemask at room air to PACU. Oxygen tubing connected to wall O2 in PACU, SpO2, NiBP, and EKG monitors and alarms on and functioning, Albert Hugger warmer connected to patient gown, report on patient's clinical status given to PACU RN, RN questions answered.  Handoff Report: Identifed the Patient, Identified the Reponsible Provider, Reviewed the pertinent medical history, Discussed the surgical course, Reviewed Intra-OP anesthesia mangement and issues during anesthesia, Set expectations for post-procedure period and Allowed opportunity for questions and acknowledgement of understanding      Vitals:  Vitals Value Taken Time   BP 91/59 02/08/23 0850   Temp     Pulse 65 02/08/23 0851   Resp 9 02/08/23 0851   SpO2 98 % 02/08/23 0851   Vitals shown include unvalidated device data.    Electronically Signed By: JUNIOR Jiménez CRNA  February 8, 2023  8:53 AM

## 2023-02-08 NOTE — OP NOTE
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  IV sedation was administered and his left hand and wrist were then sterilely prepped and draped.  1% lidocaine without epinephrine was used to infiltrate the incisional area until adequate local anesthesia was obtained.  Curvilinear incision was made approximately 2 cm in length approximating the proximal palmar crease with a slight ulnar extension at the wrist crease.  This was carried down sharply with a #15 blade and a self-retaining retractor was advanced.  Flexor retinaculum was identified and sharply divided using a #15 blade.  Median nerve was evident immediately underlying and appeared to be tightly compressed just at and slightly distal to the wrist crease.  This was carefully opened and dissection proceeded distally and then proximally to complete the decompression.  We encountered no particular difficulties with this procedure.  Hemostasis was achieved using judicious bipolar cautery.  Wound was copiously irrigated with Ancef irrigation.  Self-retaining retractor was removed and the wound was closed in single layer using interrupted vertical mattress sutures with 4-0 nylon.  Sterile dressing was then applied and the patient was subsequently transported to the recovery room in stable condition.

## 2023-02-08 NOTE — ANESTHESIA POSTPROCEDURE EVALUATION
Patient: Salvatore Guevara    Procedure: Procedure(s):  Left carpal tunnel release       Anesthesia Type:  MAC    Note:     Postop Pain Control: Uneventful            Sign Out: Well controlled pain   PONV: No   Neuro/Psych: Uneventful            Sign Out: Acceptable/Baseline neuro status   Airway/Respiratory: Uneventful            Sign Out: Acceptable/Baseline resp. status   CV/Hemodynamics: Uneventful            Sign Out: Acceptable CV status   Other NRE: NONE   DID A NON-ROUTINE EVENT OCCUR?            Last vitals:  Vitals Value Taken Time   /73 02/08/23 0945   Temp 36.2  C (97.2  F) 02/08/23 0849   Pulse 63 02/08/23 1001   Resp 9 02/08/23 1001   SpO2 91 % 02/08/23 0958   Vitals shown include unvalidated device data.    Electronically Signed By: Nikunj Galdamez MD  February 8, 2023  2:10 PM

## 2023-02-08 NOTE — OR NURSING
Pt states oxycodone worked fine for post op pain control for last procedure, no itching reported.

## 2023-02-08 NOTE — DISCHARGE INSTRUCTIONS
Same Day Surgery Discharge Instructions for  Sedation and General Anesthesia     It's not unusual to feel dizzy, light-headed or faint for up to 24 hours after surgery or while taking pain medication.  If you have these symptoms: sit for a few minutes before standing and have someone assist you when you get up to walk or use the bathroom.    You should rest and relax for the next 24 hours. We recommend you make arrangements to have an adult stay with you for at least 24 hours after your discharge.  Avoid hazardous and strenuous activity.    DO NOT DRIVE any vehicle or operate mechanical equipment for 24 hours following the end of your surgery.  Even though you may feel normal, your reactions may be affected by the medication you have received.    Do not drink alcoholic beverages for 24 hours following surgery.     Slowly progress to your regular diet as you feel able. It's not unusual to feel nauseated and/or vomit after receiving anesthesia.  If you develop these symptoms, drink clear liquids (apple juice, ginger ale, broth, 7-up, etc. ) until you feel better.  If your nausea and vomiting persists for 24 hours, please notify your surgeon.      All narcotic pain medications, along with inactivity and anesthesia, can cause constipation. Drinking plenty of liquids and increasing fiber intake will help.    For any questions of a medical nature, call your surgeon.    Do not make important decisions for 24 hours.    If you had general anesthesia, you may have a sore throat for a couple of days related to the breathing tube used during surgery.  You may use Cepacol lozenges to help with this discomfort.  If it worsens or if you develop a fever, contact your surgeon.     If you feel your pain is not well managed with the pain medications prescribed by your surgeon, please contact your surgeon's office to let them know so they can address your concerns.             ** If you have questions or concerns about your  procedure  call Dr Chan Ca, [phone] **

## 2023-02-08 NOTE — ANESTHESIA PREPROCEDURE EVALUATION
Anesthesia Pre-Procedure Evaluation    Patient: Salvatore Guevara   MRN: [id number] : 1955        Procedure : Procedure(s):  Left carpal tunnel release          Past Medical History:   Diagnosis Date     Actinic keratosis      Arthritis of hip      Bilateral carpal tunnel syndrome      Coronary artery disease      Cubital tunnel syndrome      Degenerative joint disease      Diverticular disease of colon      ED (erectile dysfunction)      History of basal cell carcinoma     right ear     Hypothyroidism      Noninfectious ileitis       Past Surgical History:   Procedure Laterality Date     CARDIAC CATHERIZATION       COLONOSCOPY       ORTHOPEDIC SURGERY      RIGHT & LEFT TOTAL HIP ARTHROPLASTY     RELEASE CARPAL TUNNEL Right 2022    Procedure: Right carpal tunnel release;  Surgeon: Chan Ca MD;  Location: [facility] OR     TONSILLECTOMY        Allergies   Allergen Reactions     Norco [Hydrocodone-Acetaminophen] Itching     Pt states no reactions to oxycodone      Social History     Tobacco Use     Smoking status: Never     Smokeless tobacco: Never   Substance Use Topics     Alcohol use: Yes     Comment: 14 BEERS/ WEEK      Wt Readings from Last 1 Encounters:   23 88 kg (194 lb)        Anesthesia Evaluation   Pt has had prior anesthetic. Type: General.    No history of anesthetic complications       ROS/MED HX  ENT/Pulmonary:    (-) tobacco use   Neurologic:       Cardiovascular:     (+) --CAD ---    METS/Exercise Tolerance:     Hematologic:       Musculoskeletal:   (+) arthritis,     GI/Hepatic:       Renal/Genitourinary:       Endo:     (+) thyroid problem, hypothyroidism, Obesity (Class 1),     Psychiatric/Substance Use:       Infectious Disease:       Malignancy:       Other:               OUTSIDE LABS:  CBC: No results found for: WBC, HGB, HCT, PLT  BMP: No results found for: NA, POTASSIUM, CHLORIDE, CO2, BUN, CR, GLC  COAGS: No results found for: PTT, INR, FIBR  POC: No results  found for: BGM, HCG, HCGS  HEPATIC: No results found for: ALBUMIN, PROTTOTAL, ALT, AST, GGT, ALKPHOS, BILITOTAL, BILIDIRECT, RYLAN  OTHER: No results found for: PH, LACT, A1C, FREDRICK, PHOS, MAG, LIPASE, AMYLASE, TSH, T4, T3, CRP, SED    Anesthesia Plan    ASA Status:  2   NPO Status:  NPO Appropriate    Anesthesia Type: MAC.     - Reason for MAC: straight local not clinically adequate   Induction: Intravenous.   Maintenance: Balanced.        Consents    Anesthesia Plan(s) and associated risks, benefits, and realistic alternatives discussed. Questions answered and patient/representative(s) expressed understanding.    - Discussed:     - Discussed with:  Patient         Postoperative Care    Pain management: IV analgesics, Oral pain medications, Multi-modal analgesia.   PONV prophylaxis: Ondansetron (or other 5HT-3)     Comments:                Nikunj Galdamez MD

## 2023-02-08 NOTE — INTERVAL H&P NOTE
"I have reviewed the surgical (or preoperative) H&P that is linked to this encounter, and examined the patient. There are no significant changes    Clinical Conditions Present on Arrival:  Clinically Significant Risk Factors Present on Admission                    # Obesity: Estimated body mass index is 30.38 kg/m  as calculated from the following:    Height as of this encounter: 1.702 m (5' 7\").    Weight as of this encounter: 88 kg (194 lb).       "

## 2023-02-28 ENCOUNTER — OFFICE VISIT (OUTPATIENT)
Dept: NEUROSURGERY | Facility: CLINIC | Age: 68
End: 2023-02-28
Payer: MEDICARE

## 2023-02-28 DIAGNOSIS — G56.03 BILATERAL CARPAL TUNNEL SYNDROME: ICD-10-CM

## 2023-02-28 PROCEDURE — 99207 PR NO CHARGE NURSE ONLY: CPT

## 2023-02-28 RX ORDER — OXYCODONE HYDROCHLORIDE 5 MG/1
5-10 TABLET ORAL EVERY 4 HOURS PRN
Qty: 20 TABLET | Refills: 0 | Status: CANCELLED | OUTPATIENT
Start: 2023-02-28

## 2023-02-28 ASSESSMENT — PAIN SCALES - GENERAL: PAINLEVEL: EXTREME PAIN (8)

## 2023-02-28 NOTE — PATIENT INSTRUCTIONS
Instructions for Patient    Incision  Steri-strips were applied today, they will fall off in 7-10 days. Do not to pull them off.   Keep your incision clean and dry at all times.   It is okay to shower, just pat the incision dry   No submerging incision in water such as pools, hot tubs, or baths for at least 8 weeks and until the incision is healed  Do not apply lotions or ointments to incision    Activity  No lifting greater than 10 pounds. No bending, twisting, or overhead reaching.  Walking is the best way to start exercise after surgery. Take short frequent walks. You may gradually increase the distance as tolerated. If you feel pain, decrease your activity, but DO NOT stop walking. Walking will help you gain strength, prevent muscle soreness and spasms, and prevent blood clots.   Continue to wear brace as directed by your surgeon  Avoid bed rest and prolonged sitting for longer than 30 minutes (change positions frequently while awake)  No contact sports or high impact activities such as; running/jogging, snowmobile or 4 jorge riding or any other recreational vehicles until after given clearance at one of your follow up visits    Medications   Refills of pain medication:   Please call the neurosurgery clinic to request 2-3 days before you run out  Weaning from narcotic pain medications  When it is time, start weaning by extending the time between doses.   For example, if you're taking 2 tabs every 4 hours, spread it out to 2 tabs over 4.5, 5, 6 hours. At that point you can certainly cut down to 1 tab, then wean to an as needed basis until completely done with them.Refills: call our clinic 2-3 business days before you are out of medication. A nurse will call you back to obtain a pain assessment.   Don't take more than 3000mg of Acetaminophen in 24 hours  Ok to begin taking Aspirin and NSAIDs (ex: ibuprofen/Advil)  Encouraged icing for at least 3-4 times throughout the day for 20-30 minutes at a time. Avoid heat  to the incision area.   Taking stool softeners regularly can reduce constipation commonly caused by narcotic pain medications.    Contact clinic or Emergency Room if you develop:   Infection (redness, swelling, warmth, drainage, fever over 101 F)  New injury  Bladder or bowel changes or loss of control    Signs of blood clot:  Swelling and/or warmth in one or both legs  Pain or tenderness in your leg, ankle, foot, or arm   Red or discolored skin     Go to the Emergency Room   If sudden onset of severe headache, weakness, confusion, change in level of consciousness, pain, or loss of movement.  Chest pain  Trouble breathing     Post-operative appointments  6 week and 3 months post-op    [facility]  [facility]  [address] [place] [address]  Telephone:  [phone]   Fax:  [phone]

## 2023-02-28 NOTE — PROGRESS NOTES
"Post-op Nurse Visit:    Patient seen today per the order of  Chan Ca MD .   DOS: 2/8/23  Procedure: Left carpal tunnel release    Pain/Neuro Assessment  0/10, sometimes pain increases to 8/10.  Numbness/tingling: intermittently, \"definitely better\" compared to preop  Strength: Equal strength to bilateral upper extremities. Denies weakness.     Pain Relief Measures:  Oxycodone: PRN, rarely. Did take prior to suture removal  Tylenol: PRN  Ice: PRN      Incision   Incision inspected. Edges well-approximated. No redness, swelling, drainage, or warmth noted. Incision prepped with ChloraPrep and suture(s) removed without difficulty. Steri-strips applied.    Activity  Following restrictions   Falls: none  Patient is walking frequently without difficulty.   Denies redness, swelling, or warmth to bilateral calves.   Wearing brace? No    GI/  Difficulty swallowing? No  Patient's appetite is normal  Bowel/bladder problems? No  Taking stool softeners? No     Refills/x-rays/return to work  Refills given at this appointment? Yes, routed to [facility] pool  Sent for x-rays after this appointment? No  Ordered future x-rays? No  Return to work discussed at this appointment? No     All of patient's questions addressed today. Patient was instructed to call with any additional questions/concerns.          "

## 2023-03-01 DIAGNOSIS — G56.03 BILATERAL CARPAL TUNNEL SYNDROME: ICD-10-CM

## 2023-03-01 RX ORDER — OXYCODONE HYDROCHLORIDE 5 MG/1
5-10 TABLET ORAL EVERY 4 HOURS PRN
Qty: 20 TABLET | Refills: 0 | Status: SHIPPED | OUTPATIENT
Start: 2023-03-01

## 2023-03-13 ENCOUNTER — PRE VISIT (OUTPATIENT)
Dept: NEUROSURGERY | Facility: CLINIC | Age: 68
End: 2023-03-13
Payer: MEDICARE

## 2023-03-13 NOTE — TELEPHONE ENCOUNTER
FUTURE VISIT INFORMATION      FUTURE VISIT INFORMATION:    Date: 3/22/23    Time: 1pm    Location:  Clinic  REFERRAL INFORMATION:    Referring provider:  Chan Ca    Referring providers clinic:   Neurosurgery    Reason for visit/diagnosis  6wk incision ck bi lateral carpal tunnel     RECORDS REQUESTED FROM:       Clinic name Comments Records Status Imaging Status

## 2023-03-22 ENCOUNTER — OFFICE VISIT (OUTPATIENT)
Dept: NEUROSURGERY | Facility: CLINIC | Age: 68
End: 2023-03-22
Payer: MEDICARE

## 2023-03-22 ENCOUNTER — DOCUMENTATION ONLY (OUTPATIENT)
Dept: NEUROSURGERY | Facility: CLINIC | Age: 68
End: 2023-03-22

## 2023-03-22 VITALS — OXYGEN SATURATION: 98 % | SYSTOLIC BLOOD PRESSURE: 129 MMHG | DIASTOLIC BLOOD PRESSURE: 78 MMHG | HEART RATE: 65 BPM

## 2023-03-22 DIAGNOSIS — G56.03 BILATERAL CARPAL TUNNEL SYNDROME: Primary | ICD-10-CM

## 2023-03-22 DIAGNOSIS — Z98.890 S/P CARPAL TUNNEL RELEASE: ICD-10-CM

## 2023-03-22 PROCEDURE — 99024 POSTOP FOLLOW-UP VISIT: CPT | Performed by: NURSE PRACTITIONER

## 2023-03-22 ASSESSMENT — PAIN SCALES - GENERAL: PAINLEVEL: MODERATE PAIN (5)

## 2023-03-22 NOTE — PROGRESS NOTES
[facility] Neurosurgery  Neurosurgery Followup:    HPI: 67M with a history of left CTS s/p left carpal tunnel release with Dr. Ca on 2/8/2023. He reports doing well until a few weeks ago. Now has return of tingling of his left fingers with return of tingling of fingers on the right as well. He does feel his left grasp is weaker, unchanged. Incision intact. He is interested in hand therapy at this time.    Medical, surgical, family, and social history unchanged since prior exam.    Exam:  Constitutional:  Alert, well nourished, NAD.  HEENT: Normocephalic, atraumatic.   Pulm:  Without shortness of breath   CV:  No pitting edema of BLE.     Vital Signs:  /78   Pulse 65   SpO2 98%     Neurological:  Awake  Alert  Oriented x 3  Motor exam:     Shoulder Abduction:  Right:  5/5    Left:  5/5  Biceps:                      Right:  5/5    Left:  5/5  Triceps:                     Right:  5/5    Left:  5/5  Wrist Extensors:       Right:  5/5    Left:  5/5  Wrist Flexors:           Right:  5/5    Left:  5/5  Intrinsics:                  Right:  5/5    Left:  5/5     Able to spontaneously move U/E bilaterally  Sensation intact throughout all U/E dermatomes    Incisions:  Healing nicely    A/P: s/p left carpal tunnel release  May increase activity as tolerated. May begin hand therapy. Referral to be faxed to [facility] in [place] per his request. Follow up via telephone visit in 6 weeks. He verbalized understanding and agreement.    Patient Instructions   -Hand therapy ordered for [facility] in [place]. They will contact you to schedule.  -Follow up telephone visit in 6 weeks or sooner if new symptoms develop.   -Please contact our clinic with questions or concerns at [phone].      Ashley Ariza, SUNDAY  [facility] Neurosurgery  [address] [place] [address]  [address] [place] [address]  Tel [phone]  Fax [phone]

## 2023-03-22 NOTE — PATIENT INSTRUCTIONS
-Hand therapy ordered for [facility] in [place]. They will contact you to schedule.  -Follow up telephone visit in 6 weeks or sooner if new symptoms develop.   -Please contact our clinic with questions or concerns at [phone].

## 2023-03-22 NOTE — LETTER
3/22/2023         RE: Salvatore Guevara  [address]  [facility] [address]        Dear Colleague,    Thank you for referring your patient, Salvatore Guevara, to the [facility] NEUROLOGY CLINICS [facility]. Please see a copy of my visit note below.    [facility] Neurosurgery  Neurosurgery Followup:    HPI: 67M with a history of left CTS s/p left carpal tunnel release with Dr. Ca on 2/8/2023. He reports doing well until a few weeks ago. Now has return of tingling of his left fingers with return of tingling of fingers on the right as well. He does feel his left grasp is weaker, unchanged. Incision intact. He is interested in hand therapy at this time.    Medical, surgical, family, and social history unchanged since prior exam.    Exam:  Constitutional:  Alert, well nourished, NAD.  HEENT: Normocephalic, atraumatic.   Pulm:  Without shortness of breath   CV:  No pitting edema of BLE.     Vital Signs:  /78   Pulse 65   SpO2 98%     Neurological:  Awake  Alert  Oriented x 3  Motor exam:     Shoulder Abduction:  Right:  5/5    Left:  5/5  Biceps:                      Right:  5/5    Left:  5/5  Triceps:                     Right:  5/5    Left:  5/5  Wrist Extensors:       Right:  5/5    Left:  5/5  Wrist Flexors:           Right:  5/5    Left:  5/5  Intrinsics:                  Right:  5/5    Left:  5/5     Able to spontaneously move U/E bilaterally  Sensation intact throughout all U/E dermatomes    Incisions:  Healing nicely    A/P: s/p left carpal tunnel release  May increase activity as tolerated. May begin hand therapy. Referral to be faxed to [facility] in [place] per his request. Follow up via telephone visit in 6 weeks. He verbalized understanding and agreement.    Patient Instructions   -Hand therapy ordered for [facility] in [place]. They will contact you to schedule.  -Follow up telephone visit in 6 weeks or sooner if new symptoms develop.   -Please contact our clinic with  questions or concerns at [phone].      Ashley Ariza, SUNDAY  [address] [address]  Tel [phone]  Fax [phone]        Again, thank you for allowing me to participate in the care of your patient.        Sincerely,        Ashley Ariza, NP

## 2023-03-22 NOTE — NURSING NOTE
"Salvatore Guevara is a 67 year old male who presents for:  Chief Complaint   Patient presents with     RECHECK     Tingling in the left fingers and some aching in the pinky and ring finger on the right        Initial Vitals:  /78   Pulse 65   SpO2 98%  Estimated body mass index is 30.38 kg/m  as calculated from the following:    Height as of 2/8/23: 5' 7\" (1.702 m).    Weight as of 2/8/23: 194 lb (88 kg).. There is no height or weight on file to calculate BSA. BP completed using cuff size: regular  Moderate Pain (5)    Nursing Comments:     Margarette Olvera    "

## 2023-04-11 ENCOUNTER — MEDICAL CORRESPONDENCE (OUTPATIENT)
Dept: HEALTH INFORMATION MANAGEMENT | Facility: CLINIC | Age: 68
End: 2023-04-11

## 2023-05-03 ENCOUNTER — VIRTUAL VISIT (OUTPATIENT)
Dept: NEUROSURGERY | Facility: CLINIC | Age: 68
End: 2023-05-03
Payer: MEDICARE

## 2023-05-03 DIAGNOSIS — G56.03 BILATERAL CARPAL TUNNEL SYNDROME: Primary | ICD-10-CM

## 2023-05-03 PROCEDURE — 99024 POSTOP FOLLOW-UP VISIT: CPT | Performed by: NURSE PRACTITIONER

## 2023-05-03 NOTE — PROGRESS NOTES
HPI: 67M with a history of left CTS s/p left carpal tunnel release with Dr. Ca on 2/8/2023. He reports ongoing tingling in his 3rd digit and medial aspect of the 4th digit. He continues to report his left hand grasp is weaker, yet improving. No incisional concerns. He continues with occupational hand therapy for this. He states he has two sessions left. He denies any neck pain or radicular arm pain. He has no change in symptoms with range of motion.     Medical, surgical, family, and social history unchanged since prior exam.    ROS: 10 point ROS neg other than the symptoms noted above in the HPI.    Imaging: No recent imaging to review.    Assessment/Plan:  S/p carpal tunnel release    Will continue OT at this time as he has a couple sessions left. He is interested in return to discuss ongoing symptoms with Dr. Ca when he returns. He will contact us if new symptoms develop.     Patient location: home   Provider location: office    Ashley Ariza CNP  [address] [address]  Tel [phone]  Fax [phone]

## 2023-05-03 NOTE — LETTER
5/3/2023         RE: Salvatore Guevara  [address]  [facility] [address]        Dear Colleague,    Thank you for referring your patient, Salvatore Guevara, to the [facility] NEUROLOGY CLINICS [facility]. Please see a copy of my visit note below.    HPI: 67M with a history of left CTS s/p left carpal tunnel release with Dr. Ca on 2/8/2023. He reports ongoing tingling in his 3rd digit and medial aspect of the 4th digit. He continues to report his left hand grasp is weaker, yet improving. No incisional concerns. He continues with occupational hand therapy for this. He states he has two sessions left. He denies any neck pain or radicular arm pain. He has no change in symptoms with range of motion.     Medical, surgical, family, and social history unchanged since prior exam.    ROS: 10 point ROS neg other than the symptoms noted above in the HPI.    Imaging: No recent imaging to review.    Assessment/Plan:  S/p carpal tunnel release    Will continue OT at this time as he has a couple sessions left. He is interested in return to discuss ongoing symptoms with Dr. Ca when he returns. He will contact us if new symptoms develop.     Patient location: home   Provider location: office    Ashley Ariza CNP  [facility] Neurosurgery  [address] [place] [address]  Tel [phone]  Fax [phone]          Again, thank you for allowing me to participate in the care of your patient.        Sincerely,        Ashley Ariza NP

## 2023-05-08 ENCOUNTER — DOCUMENTATION ONLY (OUTPATIENT)
Dept: NEUROSURGERY | Facility: CLINIC | Age: 68
End: 2023-05-08
Payer: MEDICARE

## 2023-06-06 ENCOUNTER — OFFICE VISIT (OUTPATIENT)
Dept: NEUROSURGERY | Facility: CLINIC | Age: 68
End: 2023-06-06
Payer: MEDICARE

## 2023-06-06 VITALS — OXYGEN SATURATION: 100 % | HEART RATE: 55 BPM | DIASTOLIC BLOOD PRESSURE: 63 MMHG | SYSTOLIC BLOOD PRESSURE: 154 MMHG

## 2023-06-06 DIAGNOSIS — Z98.890 S/P CARPAL TUNNEL RELEASE: Primary | ICD-10-CM

## 2023-06-06 PROCEDURE — 99213 OFFICE O/P EST LOW 20 MIN: CPT | Performed by: NEUROLOGICAL SURGERY

## 2023-06-06 ASSESSMENT — PAIN SCALES - GENERAL: PAINLEVEL: NO PAIN (1)

## 2023-06-06 NOTE — PROGRESS NOTES
Mr. Guevara is seen back at his request for paresthesias into his left thumb and index finger which have developed over the past several weeks.  His history is significant for an uncomplicated carpal tunnel release performed 3 months ago.  Subsequent to that surgery he experienced relief of his symptoms, namely resolution of the numbness involving his hands as well as the disappearance of symptoms when he is driving or sleeping.  He noted however that several weeks ago he began to experience some pain last paresthesia into his left index finger primarily and occasionally into his left thumb.  He denies any known antecedent event before the onset of the symptoms.  He had previously undergone a right carpal tunnel release and did not experience similar symptoms subsequent to that surgery.  He has no complaint of pain in his hand or radicular pain.  He denies any association with cervical range of motion, neck pain, etc.  He has no weakness with use of his hand or his proximal left upper extremity.    On examination, his left volar wrist incision is well-healed without evidence of significant focal tenderness, fluid or erythema.  Phalen sign is negative.  Tinel's sign is negative.  Cervical range of motion appears normal for age and condition.  Spurling sign is negative.  Motor examination of his bilateral upper extremities shows no evidence of focal deficit or asymmetry.  Deep tendon reflexes are 1+ and equal at the biceps, triceps and brachioradialis bilaterally.  There are no long tract findings noted.    Assessment: Recent onset of paresthesias possibly in the left C6 pattern in this man who underwent a successful and uncomplicated carpal tunnel release on the left 3 months ago.  There is no clear association between the symptoms and cervical range of motion and there is no report of cervical pain, radicular pain in the proximal left arm nor any objective evidence of focal neurologic deficit involving the left  upper extremity.  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  This however was discounted months ago as a cause for Mr. Dugan Buerger's hand symptoms based on examination and EMG evidence.  I told him today that it is possible he is developing a C6 radiculopathy but this is difficult to sort out in a relative immediate postoperative period.  He is undergoing occupational therapy for hand rehabilitation currently.  I have recommended that we defer additional evaluation for this problem for at least several months.  If his paresthesias do not resolve or substantially improve, I would repeat EMG to determine if this is more likely peripheral neuropathy or cervical radiculopathy.  Presently, his symptoms do not appear to be functionally limiting and I do not believe that any type of surgical intervention in the cervical spine would be reasonable.  I have asked him to call or return to the office at any time should his symptoms change significantly in severity or character before neck scheduled visit.    Approximately 25 minutes in total was spent reviewing clinical findings, radiographic images, differential diagnosis and management alternatives with the patient.  I will plan to see him back in the office in 3 months.

## 2023-06-06 NOTE — PATIENT INSTRUCTIONS
Patient Next Steps:      Dr. Ca would like to see you back in the clinic for follow up in 3 month(s). Please call the number below to schedule.       Please call us if you have any further questions or concerns.    [facility]   Phone: [phone]  Fax: [phone]

## 2023-06-06 NOTE — LETTER
6/6/2023         RE: Salvatore Guevara  [address]  [facility] [id number]        Dear Colleague,    Thank you for referring your patient, Salvatore Guevara, to the [facility]. Please see a copy of my visit note below.    Mr. Guevara is seen back at his request for paresthesias into his left thumb and index finger which have developed over the past several weeks.  His history is significant for an uncomplicated carpal tunnel release performed 3 months ago.  Subsequent to that surgery he experienced relief of his symptoms, namely resolution of the numbness involving his hands as well as the disappearance of symptoms when he is driving or sleeping.  He noted however that several weeks ago he began to experience some pain last paresthesia into his left index finger primarily and occasionally into his left thumb.  He denies any known antecedent event before the onset of the symptoms.  He had previously undergone a right carpal tunnel release and did not experience similar symptoms subsequent to that surgery.  He has no complaint of pain in his hand or radicular pain.  He denies any association with cervical range of motion, neck pain, etc.  He has no weakness with use of his hand or his proximal left upper extremity.    On examination, his left volar wrist incision is well-healed without evidence of significant focal tenderness, fluid or erythema.  Phalen sign is negative.  Tinel's sign is negative.  Cervical range of motion appears normal for age and condition.  Spurling sign is negative.  Motor examination of his bilateral upper extremities shows no evidence of focal deficit or asymmetry.  Deep tendon reflexes are 1+ and equal at the biceps, triceps and brachioradialis bilaterally.  There are no long tract findings noted.    Assessment: Recent onset of paresthesias possibly in the left C6 pattern in this man who underwent a successful and uncomplicated carpal tunnel  release on the left 3 months ago.  There is no clear association between the symptoms and cervical range of motion and there is no report of cervical pain, radicular pain in the proximal left arm nor any objective evidence of focal neurologic deficit involving the left upper extremity.  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  This however was discounted months ago as a cause for Mr. Dugan Buerger's hand symptoms based on examination and EMG evidence.  I told him today that it is possible he is developing a C6 radiculopathy but this is difficult to sort out in a relative immediate postoperative period.  He is undergoing occupational therapy for hand rehabilitation currently.  I have recommended that we defer additional evaluation for this problem for at least several months.  If his paresthesias do not resolve or substantially improve, I would repeat EMG to determine if this is more likely peripheral neuropathy or cervical radiculopathy.  Presently, his symptoms do not appear to be functionally limiting and I do not believe that any type of surgical intervention in the cervical spine would be reasonable.  I have asked him to call or return to the office at any time should his symptoms change significantly in severity or character before neck scheduled visit.    Approximately 25 minutes in total was spent reviewing clinical findings, radiographic images, differential diagnosis and management alternatives with the patient.  I will plan to see him back in the office in 3 months.      Again, thank you for allowing me to participate in the care of your patient.        Sincerely,        Chan Ca MD

## 2023-06-06 NOTE — NURSING NOTE
"Salvatore Guevara is a 68 year old male who presents for:  Chief Complaint   Patient presents with     RECHECK        Initial Vitals:  BP (!) 154/63   Pulse 55   SpO2 100%  Estimated body mass index is 30.38 kg/m  as calculated from the following:    Height as of 2/8/23: 5' 7\" (1.702 m).    Weight as of 2/8/23: 194 lb (88 kg).. There is no height or weight on file to calculate BSA. BP completed using cuff size: regular  No Pain (1)    Ilia Liang    "

## 2023-06-12 ENCOUNTER — MEDICAL CORRESPONDENCE (OUTPATIENT)
Dept: NEUROSURGERY | Facility: CLINIC | Age: 68
End: 2023-06-12
Payer: MEDICARE

## 2023-08-07 ENCOUNTER — DOCUMENTATION ONLY (OUTPATIENT)
Dept: NEUROSURGERY | Facility: CLINIC | Age: 68
End: 2023-08-07
Payer: MEDICARE

## 2024-01-21 ENCOUNTER — HEALTH MAINTENANCE LETTER (OUTPATIENT)
Age: 69
End: 2024-01-21

## 2025-02-01 ENCOUNTER — HEALTH MAINTENANCE LETTER (OUTPATIENT)
Age: 70
End: 2025-02-01

## (undated) DEVICE — DRSG GAUZE 4X4" 3033

## (undated) DEVICE — BNDG ELASTIC 2"X5YDS UNSTERILE 6611-20

## (undated) DEVICE — DRAPE IOBAN INCISE 23X17" 6650EZ

## (undated) DEVICE — LINEN TOWEL PACK X5 5464

## (undated) DEVICE — SOL WATER IRRIG 1000ML BOTTLE 2F7114

## (undated) DEVICE — DRAPE STOCKINETTE IMPERVIOUS 12" 1587

## (undated) DEVICE — GLOVE PROTEXIS BLUE W/NEU-THERA 8.5  2D73EB85

## (undated) DEVICE — PREP DURAPREP 26ML APL 8630

## (undated) DEVICE — BASIN SET MAJOR

## (undated) DEVICE — SPONGE BALL KERLIX ROUND XL W/O STRING LATEX 4935

## (undated) DEVICE — PREP SKIN SCRUB TRAY 4461A

## (undated) DEVICE — PACK HAND WRIST SOP15HWFSP

## (undated) DEVICE — SU ETHILON 4-0 FS-2 18" 662H

## (undated) DEVICE — GLOVE PROTEXIS BLUE W/NEU-THERA 8.0  2D73EB80

## (undated) DEVICE — TUBING SET IRR MALIS BIPOLAR CORD INTEGRATE 6790-100-003

## (undated) RX ORDER — FENTANYL CITRATE 50 UG/ML
INJECTION, SOLUTION INTRAMUSCULAR; INTRAVENOUS
Status: DISPENSED
Start: 2022-09-22

## (undated) RX ORDER — PROPOFOL 10 MG/ML
INJECTION, EMULSION INTRAVENOUS
Status: DISPENSED
Start: 2022-09-22

## (undated) RX ORDER — CEFAZOLIN SODIUM 1 G/3ML
INJECTION, POWDER, FOR SOLUTION INTRAMUSCULAR; INTRAVENOUS
Status: DISPENSED
Start: 2023-02-08

## (undated) RX ORDER — ONDANSETRON 2 MG/ML
INJECTION INTRAMUSCULAR; INTRAVENOUS
Status: DISPENSED
Start: 2023-02-08

## (undated) RX ORDER — PROPOFOL 10 MG/ML
INJECTION, EMULSION INTRAVENOUS
Status: DISPENSED
Start: 2023-02-08

## (undated) RX ORDER — CEFAZOLIN SODIUM/WATER 2 G/20 ML
SYRINGE (ML) INTRAVENOUS
Status: DISPENSED
Start: 2022-09-22

## (undated) RX ORDER — LIDOCAINE HYDROCHLORIDE 10 MG/ML
INJECTION, SOLUTION EPIDURAL; INFILTRATION; INTRACAUDAL; PERINEURAL
Status: DISPENSED
Start: 2022-09-22

## (undated) RX ORDER — LIDOCAINE HYDROCHLORIDE 10 MG/ML
INJECTION, SOLUTION EPIDURAL; INFILTRATION; INTRACAUDAL; PERINEURAL
Status: DISPENSED
Start: 2023-02-08

## (undated) RX ORDER — DEXAMETHASONE SODIUM PHOSPHATE 4 MG/ML
INJECTION, SOLUTION INTRA-ARTICULAR; INTRALESIONAL; INTRAMUSCULAR; INTRAVENOUS; SOFT TISSUE
Status: DISPENSED
Start: 2023-02-08